# Patient Record
Sex: FEMALE | Race: ASIAN | NOT HISPANIC OR LATINO
[De-identification: names, ages, dates, MRNs, and addresses within clinical notes are randomized per-mention and may not be internally consistent; named-entity substitution may affect disease eponyms.]

---

## 2017-03-07 PROBLEM — Z00.00 ENCOUNTER FOR PREVENTIVE HEALTH EXAMINATION: Status: ACTIVE | Noted: 2017-03-07

## 2017-03-13 ENCOUNTER — APPOINTMENT (OUTPATIENT)
Dept: ANTEPARTUM | Facility: CLINIC | Age: 42
End: 2017-03-13

## 2017-03-13 ENCOUNTER — ASOB RESULT (OUTPATIENT)
Age: 42
End: 2017-03-13

## 2017-04-28 ENCOUNTER — APPOINTMENT (OUTPATIENT)
Dept: ANTEPARTUM | Facility: CLINIC | Age: 42
End: 2017-04-28

## 2017-05-01 ENCOUNTER — APPOINTMENT (OUTPATIENT)
Dept: ANTEPARTUM | Facility: CLINIC | Age: 42
End: 2017-05-01

## 2017-05-01 ENCOUNTER — ASOB RESULT (OUTPATIENT)
Age: 42
End: 2017-05-01

## 2017-06-02 ENCOUNTER — APPOINTMENT (OUTPATIENT)
Dept: ANTEPARTUM | Facility: CLINIC | Age: 42
End: 2017-06-02

## 2017-06-02 ENCOUNTER — ASOB RESULT (OUTPATIENT)
Age: 42
End: 2017-06-02

## 2017-07-05 ENCOUNTER — APPOINTMENT (OUTPATIENT)
Dept: ANTEPARTUM | Facility: CLINIC | Age: 42
End: 2017-07-05

## 2017-07-05 ENCOUNTER — ASOB RESULT (OUTPATIENT)
Age: 42
End: 2017-07-05

## 2017-08-04 ENCOUNTER — APPOINTMENT (OUTPATIENT)
Dept: ANTEPARTUM | Facility: CLINIC | Age: 42
End: 2017-08-04
Payer: COMMERCIAL

## 2017-08-04 ENCOUNTER — ASOB RESULT (OUTPATIENT)
Age: 42
End: 2017-08-04

## 2017-08-04 PROCEDURE — 76816 OB US FOLLOW-UP PER FETUS: CPT

## 2017-08-04 PROCEDURE — 76819 FETAL BIOPHYS PROFIL W/O NST: CPT

## 2017-08-10 ENCOUNTER — EMERGENCY (EMERGENCY)
Facility: HOSPITAL | Age: 42
LOS: 1 days | Discharge: STILL ADMITTED OR ADMINISTRATIVE | End: 2017-08-10
Admitting: EMERGENCY MEDICINE

## 2017-08-10 ENCOUNTER — TRANSCRIPTION ENCOUNTER (OUTPATIENT)
Age: 42
End: 2017-08-10

## 2017-08-10 ENCOUNTER — INPATIENT (INPATIENT)
Facility: HOSPITAL | Age: 42
LOS: 1 days | Discharge: HOME | End: 2017-08-12
Attending: OBSTETRICS & GYNECOLOGY | Admitting: OBSTETRICS & GYNECOLOGY

## 2017-08-10 VITALS — HEIGHT: 66 IN | WEIGHT: 169.76 LBS

## 2017-08-10 VITALS
TEMPERATURE: 98 F | HEART RATE: 97 BPM | DIASTOLIC BLOOD PRESSURE: 91 MMHG | RESPIRATION RATE: 16 BRPM | SYSTOLIC BLOOD PRESSURE: 132 MMHG | OXYGEN SATURATION: 100 %

## 2017-08-10 DIAGNOSIS — O26.90 PREGNANCY RELATED CONDITIONS, UNSPECIFIED, UNSPECIFIED TRIMESTER: ICD-10-CM

## 2017-08-10 DIAGNOSIS — Z3A.00 WEEKS OF GESTATION OF PREGNANCY NOT SPECIFIED: ICD-10-CM

## 2017-08-10 LAB
BASOPHILS # BLD AUTO: 0.03 K/UL — SIGNIFICANT CHANGE UP (ref 0–0.2)
BASOPHILS NFR BLD AUTO: 0.3 % — SIGNIFICANT CHANGE UP (ref 0–2)
BASOPHILS NFR SPEC: 0 % — SIGNIFICANT CHANGE UP (ref 0–2)
BLD GP AB SCN SERPL QL: NEGATIVE — SIGNIFICANT CHANGE UP
EOSINOPHIL # BLD AUTO: 0.06 K/UL — SIGNIFICANT CHANGE UP (ref 0–0.5)
EOSINOPHIL NFR BLD AUTO: 0.6 % — SIGNIFICANT CHANGE UP (ref 0–6)
EOSINOPHIL NFR FLD: 0 % — SIGNIFICANT CHANGE UP (ref 0–6)
GIANT PLATELETS BLD QL SMEAR: PRESENT — SIGNIFICANT CHANGE UP
GRAM STN FLD: SIGNIFICANT CHANGE UP
HCT VFR BLD CALC: 39.3 % — SIGNIFICANT CHANGE UP (ref 34.5–45)
HGB BLD-MCNC: 12.7 G/DL — SIGNIFICANT CHANGE UP (ref 11.5–15.5)
IMM GRANULOCYTES # BLD AUTO: 0.43 # — SIGNIFICANT CHANGE UP
IMM GRANULOCYTES NFR BLD AUTO: 4.3 % — HIGH (ref 0–1.5)
LYMPHOCYTES # BLD AUTO: 2.09 K/UL — SIGNIFICANT CHANGE UP (ref 1–3.3)
LYMPHOCYTES # BLD AUTO: 20.7 % — SIGNIFICANT CHANGE UP (ref 13–44)
LYMPHOCYTES NFR SPEC AUTO: 20.3 % — SIGNIFICANT CHANGE UP (ref 13–44)
MCHC RBC-ENTMCNC: 27.7 PG — SIGNIFICANT CHANGE UP (ref 27–34)
MCHC RBC-ENTMCNC: 32.3 % — SIGNIFICANT CHANGE UP (ref 32–36)
MCV RBC AUTO: 85.6 FL — SIGNIFICANT CHANGE UP (ref 80–100)
METAMYELOCYTES # FLD: 0.9 % — SIGNIFICANT CHANGE UP (ref 0–1)
MONOCYTES # BLD AUTO: 0.83 K/UL — SIGNIFICANT CHANGE UP (ref 0–0.9)
MONOCYTES NFR BLD AUTO: 8.2 % — SIGNIFICANT CHANGE UP (ref 2–14)
MONOCYTES NFR BLD: 5.3 % — SIGNIFICANT CHANGE UP (ref 2–9)
MORPHOLOGY BLD-IMP: NORMAL — SIGNIFICANT CHANGE UP
MYELOCYTES NFR BLD: 1.8 % — HIGH (ref 0–0)
NEUTROPHIL AB SER-ACNC: 70.8 % — SIGNIFICANT CHANGE UP (ref 43–77)
NEUTROPHILS # BLD AUTO: 6.66 K/UL — SIGNIFICANT CHANGE UP (ref 1.8–7.4)
NEUTROPHILS NFR BLD AUTO: 65.9 % — SIGNIFICANT CHANGE UP (ref 43–77)
NEUTS BAND # BLD: 0.9 % — SIGNIFICANT CHANGE UP (ref 0–6)
NRBC # FLD: 0 — SIGNIFICANT CHANGE UP
PLATELET # BLD AUTO: 84 K/UL — LOW (ref 150–400)
PLATELET COUNT - ESTIMATE: SIGNIFICANT CHANGE UP
PMV BLD: 12.6 FL — SIGNIFICANT CHANGE UP (ref 7–13)
RBC # BLD: 4.59 M/UL — SIGNIFICANT CHANGE UP (ref 3.8–5.2)
RBC # FLD: 15.2 % — HIGH (ref 10.3–14.5)
RH IG SCN BLD-IMP: POSITIVE — SIGNIFICANT CHANGE UP
RH IG SCN BLD-IMP: POSITIVE — SIGNIFICANT CHANGE UP
SPECIMEN SOURCE: SIGNIFICANT CHANGE UP
T PALLIDUM AB TITR SER: NEGATIVE — SIGNIFICANT CHANGE UP
WBC # BLD: 10.1 K/UL — SIGNIFICANT CHANGE UP (ref 3.8–10.5)
WBC # FLD AUTO: 10.1 K/UL — SIGNIFICANT CHANGE UP (ref 3.8–10.5)

## 2017-08-10 RX ORDER — DIBUCAINE 1 %
1 OINTMENT (GRAM) RECTAL EVERY 4 HOURS
Qty: 0 | Refills: 0 | Status: DISCONTINUED | OUTPATIENT
Start: 2017-08-10 | End: 2017-08-10

## 2017-08-10 RX ORDER — ACETAMINOPHEN 500 MG
975 TABLET ORAL EVERY 6 HOURS
Qty: 0 | Refills: 0 | Status: DISCONTINUED | OUTPATIENT
Start: 2017-08-10 | End: 2017-08-12

## 2017-08-10 RX ORDER — SODIUM CHLORIDE 9 MG/ML
1000 INJECTION, SOLUTION INTRAVENOUS ONCE
Qty: 0 | Refills: 0 | Status: DISCONTINUED | OUTPATIENT
Start: 2017-08-10 | End: 2017-08-10

## 2017-08-10 RX ORDER — AMPICILLIN TRIHYDRATE 250 MG
1 CAPSULE ORAL EVERY 4 HOURS
Qty: 0 | Refills: 0 | Status: DISCONTINUED | OUTPATIENT
Start: 2017-08-10 | End: 2017-08-10

## 2017-08-10 RX ORDER — OXYTOCIN 10 UNIT/ML
333.3 VIAL (ML) INJECTION
Qty: 20 | Refills: 0 | Status: COMPLETED | OUTPATIENT
Start: 2017-08-10

## 2017-08-10 RX ORDER — AER TRAVELER 0.5 G/1
1 SOLUTION RECTAL; TOPICAL EVERY 4 HOURS
Qty: 0 | Refills: 0 | Status: DISCONTINUED | OUTPATIENT
Start: 2017-08-10 | End: 2017-08-10

## 2017-08-10 RX ORDER — BUTORPHANOL TARTRATE 2 MG/ML
2 INJECTION, SOLUTION INTRAMUSCULAR; INTRAVENOUS ONCE
Qty: 0 | Refills: 0 | Status: DISCONTINUED | OUTPATIENT
Start: 2017-08-10 | End: 2017-08-10

## 2017-08-10 RX ORDER — OXYCODONE HYDROCHLORIDE 5 MG/1
5 TABLET ORAL EVERY 4 HOURS
Qty: 0 | Refills: 0 | Status: DISCONTINUED | OUTPATIENT
Start: 2017-08-10 | End: 2017-08-12

## 2017-08-10 RX ORDER — OXYTOCIN 10 UNIT/ML
41.67 VIAL (ML) INJECTION
Qty: 20 | Refills: 0 | Status: DISCONTINUED | OUTPATIENT
Start: 2017-08-10 | End: 2017-08-10

## 2017-08-10 RX ORDER — PRAMOXINE HYDROCHLORIDE 150 MG/15G
1 AEROSOL, FOAM RECTAL EVERY 4 HOURS
Qty: 0 | Refills: 0 | Status: DISCONTINUED | OUTPATIENT
Start: 2017-08-10 | End: 2017-08-10

## 2017-08-10 RX ORDER — ACETAMINOPHEN 500 MG
975 TABLET ORAL EVERY 6 HOURS
Qty: 0 | Refills: 0 | Status: DISCONTINUED | OUTPATIENT
Start: 2017-08-10 | End: 2017-08-11

## 2017-08-10 RX ORDER — SODIUM CHLORIDE 9 MG/ML
3 INJECTION INTRAMUSCULAR; INTRAVENOUS; SUBCUTANEOUS EVERY 8 HOURS
Qty: 0 | Refills: 0 | Status: DISCONTINUED | OUTPATIENT
Start: 2017-08-10 | End: 2017-08-10

## 2017-08-10 RX ORDER — OXYTOCIN 10 UNIT/ML
2 VIAL (ML) INJECTION
Qty: 30 | Refills: 0 | Status: DISCONTINUED | OUTPATIENT
Start: 2017-08-10 | End: 2017-08-11

## 2017-08-10 RX ORDER — ACETAMINOPHEN 500 MG
975 TABLET ORAL EVERY 6 HOURS
Qty: 0 | Refills: 0 | Status: COMPLETED | OUTPATIENT
Start: 2017-08-10 | End: 2018-07-09

## 2017-08-10 RX ORDER — IBUPROFEN 200 MG
600 TABLET ORAL EVERY 6 HOURS
Qty: 0 | Refills: 0 | Status: DISCONTINUED | OUTPATIENT
Start: 2017-08-10 | End: 2017-08-12

## 2017-08-10 RX ORDER — OXYCODONE HYDROCHLORIDE 5 MG/1
5 TABLET ORAL
Qty: 0 | Refills: 0 | Status: DISCONTINUED | OUTPATIENT
Start: 2017-08-10 | End: 2017-08-12

## 2017-08-10 RX ORDER — IBUPROFEN 200 MG
600 TABLET ORAL EVERY 6 HOURS
Qty: 0 | Refills: 0 | Status: COMPLETED | OUTPATIENT
Start: 2017-08-10 | End: 2018-07-09

## 2017-08-10 RX ORDER — KETOROLAC TROMETHAMINE 30 MG/ML
30 SYRINGE (ML) INJECTION ONCE
Qty: 0 | Refills: 0 | Status: DISCONTINUED | OUTPATIENT
Start: 2017-08-10 | End: 2017-08-11

## 2017-08-10 RX ORDER — OXYTOCIN 10 UNIT/ML
333.3 VIAL (ML) INJECTION
Qty: 20 | Refills: 0 | Status: DISCONTINUED | OUTPATIENT
Start: 2017-08-10 | End: 2017-08-11

## 2017-08-10 RX ORDER — HYDROCORTISONE 1 %
1 OINTMENT (GRAM) TOPICAL EVERY 4 HOURS
Qty: 0 | Refills: 0 | Status: DISCONTINUED | OUTPATIENT
Start: 2017-08-10 | End: 2017-08-10

## 2017-08-10 RX ORDER — AMPICILLIN TRIHYDRATE 250 MG
CAPSULE ORAL
Qty: 0 | Refills: 0 | Status: DISCONTINUED | OUTPATIENT
Start: 2017-08-10 | End: 2017-08-10

## 2017-08-10 RX ORDER — SODIUM CHLORIDE 9 MG/ML
1000 INJECTION, SOLUTION INTRAVENOUS
Qty: 0 | Refills: 0 | Status: DISCONTINUED | OUTPATIENT
Start: 2017-08-10 | End: 2017-08-10

## 2017-08-10 RX ORDER — AMPICILLIN TRIHYDRATE 250 MG
2 CAPSULE ORAL ONCE
Qty: 0 | Refills: 0 | Status: COMPLETED | OUTPATIENT
Start: 2017-08-10 | End: 2017-08-10

## 2017-08-10 RX ADMIN — SODIUM CHLORIDE 125 MILLILITER(S): 9 INJECTION, SOLUTION INTRAVENOUS at 09:41

## 2017-08-10 RX ADMIN — BUTORPHANOL TARTRATE 2 MILLIGRAM(S): 2 INJECTION, SOLUTION INTRAMUSCULAR; INTRAVENOUS at 05:58

## 2017-08-10 RX ADMIN — Medication 208 GRAM(S): at 06:38

## 2017-08-10 RX ADMIN — Medication 125 MILLIUNIT(S)/MIN: at 11:42

## 2017-08-10 RX ADMIN — Medication 12 MILLIGRAM(S): at 02:37

## 2017-08-10 RX ADMIN — BUTORPHANOL TARTRATE 2 MILLIGRAM(S): 2 INJECTION, SOLUTION INTRAMUSCULAR; INTRAVENOUS at 06:12

## 2017-08-10 RX ADMIN — Medication 204 MILLIGRAM(S): at 05:55

## 2017-08-10 RX ADMIN — SODIUM CHLORIDE 125 MILLILITER(S): 9 INJECTION, SOLUTION INTRAVENOUS at 02:38

## 2017-08-10 RX ADMIN — Medication 208 GRAM(S): at 02:37

## 2017-08-10 RX ADMIN — Medication 999.9 MILLIUNIT(S)/MIN: at 10:11

## 2017-08-10 NOTE — DISCHARGE NOTE OB - PATIENT PORTAL LINK FT
“You can access the FollowHealth Patient Portal, offered by [facility], by registering with the following website: [email or web address]”

## 2017-08-10 NOTE — ED ADULT TRIAGE NOTE - CHIEF COMPLAINT QUOTE
pt is 35 weeks pregnant and states ":I think my water broke, pt is due sept 17th, denies contractions at this time, L&D called, pt to go upstairs

## 2017-08-10 NOTE — DISCHARGE NOTE OB - CARE PROVIDER_API CALL
Waleska Rangel), [address]  [address] [place] [address]  Phone: [phone]  Fax: [phone]

## 2017-08-11 LAB — SPECIMEN SOURCE: SIGNIFICANT CHANGE UP

## 2017-08-11 RX ORDER — SODIUM CHLORIDE 9 MG/ML
3 INJECTION INTRAMUSCULAR; INTRAVENOUS; SUBCUTANEOUS EVERY 8 HOURS
Qty: 0 | Refills: 0 | Status: DISCONTINUED | OUTPATIENT
Start: 2017-08-11 | End: 2017-08-11

## 2017-08-11 RX ORDER — HYDROCORTISONE 1 %
1 OINTMENT (GRAM) TOPICAL EVERY 4 HOURS
Qty: 0 | Refills: 0 | Status: DISCONTINUED | OUTPATIENT
Start: 2017-08-11 | End: 2017-08-12

## 2017-08-11 RX ORDER — PRAMOXINE HYDROCHLORIDE 150 MG/15G
1 AEROSOL, FOAM RECTAL EVERY 4 HOURS
Qty: 0 | Refills: 0 | Status: DISCONTINUED | OUTPATIENT
Start: 2017-08-11 | End: 2017-08-12

## 2017-08-11 RX ORDER — AER TRAVELER 0.5 G/1
1 SOLUTION RECTAL; TOPICAL EVERY 4 HOURS
Qty: 0 | Refills: 0 | Status: DISCONTINUED | OUTPATIENT
Start: 2017-08-11 | End: 2017-08-12

## 2017-08-11 RX ORDER — LANOLIN
1 OINTMENT (GRAM) TOPICAL EVERY 6 HOURS
Qty: 0 | Refills: 0 | Status: DISCONTINUED | OUTPATIENT
Start: 2017-08-11 | End: 2017-08-12

## 2017-08-11 RX ORDER — TETANUS TOXOID, REDUCED DIPHTHERIA TOXOID AND ACELLULAR PERTUSSIS VACCINE, ADSORBED 5; 2.5; 8; 8; 2.5 [IU]/.5ML; [IU]/.5ML; UG/.5ML; UG/.5ML; UG/.5ML
0.5 SUSPENSION INTRAMUSCULAR ONCE
Qty: 0 | Refills: 0 | Status: COMPLETED | OUTPATIENT
Start: 2017-08-11 | End: 2018-07-10

## 2017-08-11 RX ORDER — OXYTOCIN 10 UNIT/ML
41.67 VIAL (ML) INJECTION
Qty: 20 | Refills: 0 | Status: DISCONTINUED | OUTPATIENT
Start: 2017-08-11 | End: 2017-08-11

## 2017-08-11 RX ORDER — GLYCERIN ADULT
1 SUPPOSITORY, RECTAL RECTAL AT BEDTIME
Qty: 0 | Refills: 0 | Status: DISCONTINUED | OUTPATIENT
Start: 2017-08-11 | End: 2017-08-12

## 2017-08-11 RX ORDER — MAGNESIUM HYDROXIDE 400 MG/1
30 TABLET, CHEWABLE ORAL
Qty: 0 | Refills: 0 | Status: DISCONTINUED | OUTPATIENT
Start: 2017-08-11 | End: 2017-08-12

## 2017-08-11 RX ORDER — DIPHENHYDRAMINE HCL 50 MG
25 CAPSULE ORAL EVERY 6 HOURS
Qty: 0 | Refills: 0 | Status: DISCONTINUED | OUTPATIENT
Start: 2017-08-11 | End: 2017-08-12

## 2017-08-11 RX ORDER — TETANUS TOXOID, REDUCED DIPHTHERIA TOXOID AND ACELLULAR PERTUSSIS VACCINE, ADSORBED 5; 2.5; 8; 8; 2.5 [IU]/.5ML; [IU]/.5ML; UG/.5ML; UG/.5ML; UG/.5ML
0.5 SUSPENSION INTRAMUSCULAR ONCE
Qty: 0 | Refills: 0 | Status: COMPLETED | OUTPATIENT
Start: 2017-08-11 | End: 2017-08-11

## 2017-08-11 RX ORDER — DOCUSATE SODIUM 100 MG
100 CAPSULE ORAL
Qty: 0 | Refills: 0 | Status: DISCONTINUED | OUTPATIENT
Start: 2017-08-11 | End: 2017-08-12

## 2017-08-11 RX ORDER — SIMETHICONE 80 MG/1
80 TABLET, CHEWABLE ORAL EVERY 6 HOURS
Qty: 0 | Refills: 0 | Status: DISCONTINUED | OUTPATIENT
Start: 2017-08-11 | End: 2017-08-12

## 2017-08-11 RX ORDER — DIBUCAINE 1 %
1 OINTMENT (GRAM) RECTAL EVERY 4 HOURS
Qty: 0 | Refills: 0 | Status: DISCONTINUED | OUTPATIENT
Start: 2017-08-11 | End: 2017-08-12

## 2017-08-11 RX ADMIN — TETANUS TOXOID, REDUCED DIPHTHERIA TOXOID AND ACELLULAR PERTUSSIS VACCINE, ADSORBED 0.5 MILLILITER(S): 5; 2.5; 8; 8; 2.5 SUSPENSION INTRAMUSCULAR at 23:01

## 2017-08-11 RX ADMIN — Medication 600 MILLIGRAM(S): at 20:30

## 2017-08-11 RX ADMIN — Medication 600 MILLIGRAM(S): at 19:55

## 2017-08-11 NOTE — PROGRESS NOTE ADULT - ASSESSMENT
Assessment and Plan  PPD #1 s/p   Doing well.  Encourage ambulation.  PP & PPD Instructions reviewed.  CPC.  D/C home tomorrow

## 2017-08-11 NOTE — PROGRESS NOTE ADULT - SUBJECTIVE AND OBJECTIVE BOX
Post-partum Note,   She is a  42y woman who is now post-partum day: 1    Subjective:  The patient feels well.  She is ambulating.   She is tolerating regular diet.  She denies nausea and vomiting; denies fever.  She is voiding.  Her pain is controlled.  She reports normal postpartum bleeding.  She is breastfeeding and formula feeding.    Physical exam:    Vital Signs Last 24 Hrs  T(C): 36.6 (11 Aug 2017 05:39), Max: 36.6 (11 Aug 2017 05:39)  T(F): 97.9 (11 Aug 2017 05:39), Max: 97.9 (11 Aug 2017 05:39)  HR: 78 (11 Aug 2017 05:39) (78 - 82)  BP: 94/53 (11 Aug 2017 05:39) (94/53 - 97/58)  BP(mean): --  RR: 18 (11 Aug 2017 05:39) (16 - 18)  SpO2: 99% (11 Aug 2017 05:39) (99% - 100%)    Gen: NAD  Breast: Soft, nontender, not engorged.  Abdomen: Soft, nontender, no distension , firm uterine fundus at umbilicus.  Pelvic: Normal lochia noted  Ext: No calf tenderness    LABS:                        12.7   10.10 )-----------( 84       ( 10 Aug 2017 02:35 )             39.3       Rubella status:     Allergies    No Known Allergies    Intolerances      MEDICATIONS  (STANDING):  oxyCODONE    IR 5 milliGRAM(s) Oral every 3 hours  ibuprofen  Tablet 600 milliGRAM(s) Oral every 6 hours  acetaminophen   Tablet. 975 milliGRAM(s) Oral every 6 hours  diphtheria/tetanus/pertussis (acellular) Vaccine (ADAcel) 0.5 milliLiter(s) IntraMuscular once  prenatal multivitamin 1 Tablet(s) Oral daily    MEDICATIONS  (PRN):  oxyCODONE    IR 5 milliGRAM(s) Oral every 4 hours PRN Severe Pain (7 -10)  hydrocortisone 1% Cream 1 Application(s) Topical every 4 hours PRN perineal discomfort  pramoxine 1%/zinc 5% Cream 1 Application(s) Topical every 4 hours PRN perineal discomfort  dibucaine 1% Ointment 1 Application(s) Topical every 4 hours PRN Perineal Discomfort  lanolin Ointment 1 Application(s) Topical every 6 hours PRN Sore Nipples  witch hazel Pads 1 Application(s) Topical every 4 hours PRN Perineal Discomfort  simethicone 80 milliGRAM(s) Chew every 6 hours PRN Gas  diphenhydrAMINE   Capsule 25 milliGRAM(s) Oral every 6 hours PRN Itching  glycerin Suppository - Adult 1 Suppository(s) Rectal at bedtime PRN Constipation  magnesium hydroxide Suspension 30 milliLiter(s) Oral two times a day PRN Constipation  docusate sodium 100 milliGRAM(s) Oral two times a day PRN Stool Softening

## 2017-08-12 VITALS
SYSTOLIC BLOOD PRESSURE: 96 MMHG | RESPIRATION RATE: 16 BRPM | OXYGEN SATURATION: 100 % | DIASTOLIC BLOOD PRESSURE: 66 MMHG | HEART RATE: 73 BPM | TEMPERATURE: 98 F

## 2017-08-12 DIAGNOSIS — O34.219 MATERNAL CARE FOR UNSPECIFIED TYPE SCAR FROM PREVIOUS CESAREAN DELIVERY: ICD-10-CM

## 2017-08-12 RX ORDER — AMPICILLIN TRIHYDRATE 250 MG
1 CAPSULE ORAL
Qty: 28 | Refills: 0
Start: 2017-08-12 | End: 2017-08-19

## 2017-08-12 RX ORDER — AMPICILLIN TRIHYDRATE 250 MG
1 CAPSULE ORAL
Qty: 28 | Refills: 0 | OUTPATIENT
Start: 2017-08-12 | End: 2017-08-19

## 2017-08-12 RX ORDER — ACETAMINOPHEN 500 MG
3 TABLET ORAL
Qty: 0 | Refills: 0 | DISCHARGE
Start: 2017-08-12

## 2017-08-12 RX ORDER — IBUPROFEN 200 MG
1 TABLET ORAL
Qty: 0 | Refills: 0 | DISCHARGE
Start: 2017-08-12

## 2017-08-12 NOTE — PROGRESS NOTE ADULT - PROBLEM SELECTOR PLAN 1
- Pain well controlled, continue current pain regimen  - Increase ambulation, SCDs when not ambulating  - Continue regular diet  - Discharge planning     Nickie Shrestha MD, PGY1

## 2017-08-12 NOTE — PROGRESS NOTE ADULT - SUBJECTIVE AND OBJECTIVE BOX
S:  MADISON BENITEZ is a 42y Female [place] [facility] vaginal delivery Day 2.        She is comfortable and offers no compliants.    O:  T(C): 36.6 (08-12-17 @ 06:18), Max: 36.8 (08-11-17 @ 18:46)  HR: 78 (08-12-17 @ 06:18) (78 - 84)  BP: 102/62 (08-12-17 @ 06:18) (102/62 - 102/67)  RR: 17 (08-12-17 @ 06:18) (17 - 18)  SpO2: 97% (08-12-17 @ 06:18) (97% - 99%)  Wt(kg): --                       Blood type:Type + Screen (08.10.17 @ 02:08)    ABO Interpretation: O    Rh Interpretation: Positive    Antibody Screen: Negative                                  Rubella: Immune                 General: alert and oriented           Breast:  soft, nontender,            Abdomin:  soft nontender, BS+, Flatus(+), BM(+)           Fundus:  firm, nontender, below the umbilicus           Extremities:  no edema, no cyanosis, normal reflexes           Lochia:  mild    A:  Stable, Postpartum    P:   MADISON BENITEZ will be discharged home today. She is given instructions:                            1. nothing per vagina-no tampons, douches, baths, swiming or sex for 6-8 weeks             2.  to take ibuprofen 600 mg every 6 hours for pain or cramps. (Can take two Tylenol 325mg tablets every 6 hours as an                    alternative pain medication-NO MORE THAN 4000mg of Tylenol over 24hours)                       3.  Call the office and make postpartum visit for 6 weeks; sooner if bleeding becomes heavier, or she has feelings of                  depression or anxiety or develops a fever greater than 100.4 F.                 To use abdominal binder while awake as needed                 Verbal discharge instructions d/w patient  - discharge summary to be  given to her on discharge for the hospital             4.  No driving x 2 weeks.  MADISON BENITEZ is not to drive if taking narcotics             5.  Continue taking prenatal vitamins

## 2017-08-12 NOTE — PROGRESS NOTE ADULT - SUBJECTIVE AND OBJECTIVE BOX
OB Progress Note:  PPD#2    S: 41yo  PPD#2 s/p . Patient feels well. Pain is well controlled. She is tolerating a regular diet and passing flatus. She is voiding spontaneously, and ambulating without difficulty. Denies CP/SOB. Denies lightheadedness/dizziness. Denies N/V.    O:  Vitals:   Vital Signs Last 24 Hrs  T(C): 36.8 (11 Aug 2017 18:46), Max: 36.8 (11 Aug 2017 18:46)  T(F): 98.2 (11 Aug 2017 18:46), Max: 98.2 (11 Aug 2017 18:46)  HR: 84 (11 Aug 2017 18:46) (78 - 84)  BP: 102/67 (11 Aug 2017 18:46) (94/53 - 102/67)  BP(mean): --  RR: 18 (11 Aug 2017 18:46) (18 - 18)  SpO2: 99% (11 Aug 2017 18:46) (99% - 99%)    MEDICATIONS  (STANDING):  oxyCODONE    IR 5 milliGRAM(s) Oral every 3 hours  ibuprofen  Tablet 600 milliGRAM(s) Oral every 6 hours  acetaminophen   Tablet. 975 milliGRAM(s) Oral every 6 hours  prenatal multivitamin 1 Tablet(s) Oral daily    MEDICATIONS  (PRN):  oxyCODONE    IR 5 milliGRAM(s) Oral every 4 hours PRN Severe Pain (7 -10)  hydrocortisone 1% Cream 1 Application(s) Topical every 4 hours PRN perineal discomfort  pramoxine 1%/zinc 5% Cream 1 Application(s) Topical every 4 hours PRN perineal discomfort  dibucaine 1% Ointment 1 Application(s) Topical every 4 hours PRN Perineal Discomfort  lanolin Ointment 1 Application(s) Topical every 6 hours PRN Sore Nipples  witch hazel Pads 1 Application(s) Topical every 4 hours PRN Perineal Discomfort  simethicone 80 milliGRAM(s) Chew every 6 hours PRN Gas  diphenhydrAMINE   Capsule 25 milliGRAM(s) Oral every 6 hours PRN Itching  glycerin Suppository - Adult 1 Suppository(s) Rectal at bedtime PRN Constipation  magnesium hydroxide Suspension 30 milliLiter(s) Oral two times a day PRN Constipation  docusate sodium 100 milliGRAM(s) Oral two times a day PRN Stool Softening      Labs:  Blood type: O Positive  Rubella IgG: RPR: Negative                          12.7   10.10 >-----------< 84<L>    ( 08-10 @ 02:35 )             39.3    Physical Exam:  General: NAD  Abdomen: soft, non-tender, non-distended, fundus firm  Vaginal: Lochia wnl  Extremities: No erythema/edema

## 2017-08-14 LAB
-  CEFAZOLIN: SIGNIFICANT CHANGE UP
-  CEFAZOLIN: SIGNIFICANT CHANGE UP
-  CIPROFLOXACIN: SIGNIFICANT CHANGE UP
-  CIPROFLOXACIN: SIGNIFICANT CHANGE UP
-  CLINDAMYCIN: SIGNIFICANT CHANGE UP
-  CLINDAMYCIN: SIGNIFICANT CHANGE UP
-  DAPTOMYCIN: SIGNIFICANT CHANGE UP
-  ERYTHROMYCIN: SIGNIFICANT CHANGE UP
-  ERYTHROMYCIN: SIGNIFICANT CHANGE UP
-  GENTAMICIN: SIGNIFICANT CHANGE UP
-  GENTAMICIN: SIGNIFICANT CHANGE UP
-  LINEZOLID: SIGNIFICANT CHANGE UP
-  MOXIFLOXACIN(AEROBIC): SIGNIFICANT CHANGE UP
-  MOXIFLOXACIN(AEROBIC): SIGNIFICANT CHANGE UP
-  OXACILLIN: SIGNIFICANT CHANGE UP
-  OXACILLIN: SIGNIFICANT CHANGE UP
-  PENICILLIN: SIGNIFICANT CHANGE UP
-  PENICILLIN: SIGNIFICANT CHANGE UP
-  RIFAMPIN.: SIGNIFICANT CHANGE UP
-  RIFAMPIN.: SIGNIFICANT CHANGE UP
-  TETRACYCLINE: SIGNIFICANT CHANGE UP
-  TETRACYCLINE: SIGNIFICANT CHANGE UP
-  TRIMETHOPRIM/SULFAMETHOXAZOLE: SIGNIFICANT CHANGE UP
-  TRIMETHOPRIM/SULFAMETHOXAZOLE: SIGNIFICANT CHANGE UP
-  VANCOMYCIN: SIGNIFICANT CHANGE UP
-  VANCOMYCIN: SIGNIFICANT CHANGE UP
BACTERIA WND CULT: SIGNIFICANT CHANGE UP
METHOD TYPE: SIGNIFICANT CHANGE UP
METHOD TYPE: SIGNIFICANT CHANGE UP
ORGANISM # SPEC MICROSCOPIC CNT: SIGNIFICANT CHANGE UP

## 2017-08-25 ENCOUNTER — APPOINTMENT (OUTPATIENT)
Dept: ANTEPARTUM | Facility: CLINIC | Age: 42
End: 2017-08-25

## 2017-08-25 ENCOUNTER — APPOINTMENT (OUTPATIENT)
Dept: ANTEPARTUM | Facility: HOSPITAL | Age: 42
End: 2017-08-25

## 2019-11-20 ENCOUNTER — RESULT REVIEW (OUTPATIENT)
Age: 44
End: 2019-11-20

## 2021-09-04 ENCOUNTER — EMERGENCY (EMERGENCY)
Facility: HOSPITAL | Age: 46
LOS: 1 days | Discharge: HOME | End: 2021-09-04
Attending: EMERGENCY MEDICINE | Admitting: EMERGENCY MEDICINE
Payer: COMMERCIAL

## 2021-09-04 VITALS
OXYGEN SATURATION: 100 % | TEMPERATURE: 98 F | HEART RATE: 81 BPM | HEIGHT: 66 IN | RESPIRATION RATE: 16 BRPM | DIASTOLIC BLOOD PRESSURE: 55 MMHG | SYSTOLIC BLOOD PRESSURE: 121 MMHG

## 2021-09-04 VITALS
TEMPERATURE: 97 F | HEART RATE: 71 BPM | OXYGEN SATURATION: 98 % | RESPIRATION RATE: 15 BRPM | SYSTOLIC BLOOD PRESSURE: 107 MMHG | DIASTOLIC BLOOD PRESSURE: 63 MMHG

## 2021-09-04 LAB
ALBUMIN SERPL ELPH-MCNC: 4.4 G/DL — SIGNIFICANT CHANGE UP (ref 3.3–5)
ALP SERPL-CCNC: 50 U/L — SIGNIFICANT CHANGE UP (ref 40–120)
ALT FLD-CCNC: 12 U/L — SIGNIFICANT CHANGE UP (ref 4–33)
ANION GAP SERPL CALC-SCNC: 12 MMOL/L — SIGNIFICANT CHANGE UP (ref 7–14)
ANISOCYTOSIS BLD QL: SLIGHT — SIGNIFICANT CHANGE UP
APPEARANCE UR: CLEAR — SIGNIFICANT CHANGE UP
AST SERPL-CCNC: 17 U/L — SIGNIFICANT CHANGE UP (ref 4–32)
BACTERIA # UR AUTO: NEGATIVE — SIGNIFICANT CHANGE UP
BASOPHILS # BLD AUTO: 0.04 K/UL — SIGNIFICANT CHANGE UP (ref 0–0.2)
BASOPHILS NFR BLD AUTO: 0.7 % — SIGNIFICANT CHANGE UP (ref 0–2)
BILIRUB SERPL-MCNC: 0.3 MG/DL — SIGNIFICANT CHANGE UP (ref 0.2–1.2)
BILIRUB UR-MCNC: NEGATIVE — SIGNIFICANT CHANGE UP
BLD GP AB SCN SERPL QL: NEGATIVE — SIGNIFICANT CHANGE UP
BUN SERPL-MCNC: 6 MG/DL — LOW (ref 7–23)
CALCIUM SERPL-MCNC: 9.5 MG/DL — SIGNIFICANT CHANGE UP (ref 8.4–10.5)
CHLORIDE SERPL-SCNC: 97 MMOL/L — LOW (ref 98–107)
CO2 SERPL-SCNC: 22 MMOL/L — SIGNIFICANT CHANGE UP (ref 22–31)
COLOR SPEC: COLORLESS — SIGNIFICANT CHANGE UP
CREAT SERPL-MCNC: 0.74 MG/DL — SIGNIFICANT CHANGE UP (ref 0.5–1.3)
DACRYOCYTES BLD QL SMEAR: SLIGHT — SIGNIFICANT CHANGE UP
DIFF PNL FLD: ABNORMAL
ELLIPTOCYTES BLD QL SMEAR: SLIGHT — SIGNIFICANT CHANGE UP
EOSINOPHIL # BLD AUTO: 0.02 K/UL — SIGNIFICANT CHANGE UP (ref 0–0.5)
EOSINOPHIL NFR BLD AUTO: 0.4 % — SIGNIFICANT CHANGE UP (ref 0–6)
EPI CELLS # UR: 0 /HPF — SIGNIFICANT CHANGE UP (ref 0–5)
GIANT PLATELETS BLD QL SMEAR: PRESENT — SIGNIFICANT CHANGE UP
GLUCOSE SERPL-MCNC: 106 MG/DL — HIGH (ref 70–99)
GLUCOSE UR QL: NEGATIVE — SIGNIFICANT CHANGE UP
HCT VFR BLD CALC: 27.8 % — LOW (ref 34.5–45)
HGB BLD-MCNC: 7.9 G/DL — LOW (ref 11.5–15.5)
HYALINE CASTS # UR AUTO: 1 /LPF — SIGNIFICANT CHANGE UP (ref 0–7)
IANC: 3.78 K/UL — SIGNIFICANT CHANGE UP (ref 1.5–8.5)
IMM GRANULOCYTES NFR BLD AUTO: 0.5 % — SIGNIFICANT CHANGE UP (ref 0–1.5)
KETONES UR-MCNC: NEGATIVE — SIGNIFICANT CHANGE UP
LEUKOCYTE ESTERASE UR-ACNC: NEGATIVE — SIGNIFICANT CHANGE UP
LYMPHOCYTES # BLD AUTO: 1.21 K/UL — SIGNIFICANT CHANGE UP (ref 1–3.3)
LYMPHOCYTES # BLD AUTO: 22 % — SIGNIFICANT CHANGE UP (ref 13–44)
MANUAL SMEAR VERIFICATION: SIGNIFICANT CHANGE UP
MCHC RBC-ENTMCNC: 18.6 PG — LOW (ref 27–34)
MCHC RBC-ENTMCNC: 28.4 GM/DL — LOW (ref 32–36)
MCV RBC AUTO: 65.4 FL — LOW (ref 80–100)
MICROCYTES BLD QL: SLIGHT — SIGNIFICANT CHANGE UP
MONOCYTES # BLD AUTO: 0.43 K/UL — SIGNIFICANT CHANGE UP (ref 0–0.9)
MONOCYTES NFR BLD AUTO: 7.8 % — SIGNIFICANT CHANGE UP (ref 2–14)
NEUTROPHILS # BLD AUTO: 3.78 K/UL — SIGNIFICANT CHANGE UP (ref 1.8–7.4)
NEUTROPHILS NFR BLD AUTO: 68.6 % — SIGNIFICANT CHANGE UP (ref 43–77)
NITRITE UR-MCNC: NEGATIVE — SIGNIFICANT CHANGE UP
NRBC # BLD: 0 /100 WBCS — SIGNIFICANT CHANGE UP
NRBC # FLD: 0 K/UL — SIGNIFICANT CHANGE UP
PH UR: 6.5 — SIGNIFICANT CHANGE UP (ref 5–8)
PLAT MORPH BLD: NORMAL — SIGNIFICANT CHANGE UP
PLATELET # BLD AUTO: 132 K/UL — LOW (ref 150–400)
PLATELET COUNT - ESTIMATE: ABNORMAL
POIKILOCYTOSIS BLD QL AUTO: SLIGHT — SIGNIFICANT CHANGE UP
POLYCHROMASIA BLD QL SMEAR: SLIGHT — SIGNIFICANT CHANGE UP
POTASSIUM SERPL-MCNC: 3.9 MMOL/L — SIGNIFICANT CHANGE UP (ref 3.5–5.3)
POTASSIUM SERPL-SCNC: 3.9 MMOL/L — SIGNIFICANT CHANGE UP (ref 3.5–5.3)
PROT SERPL-MCNC: 7.9 G/DL — SIGNIFICANT CHANGE UP (ref 6–8.3)
PROT UR-MCNC: NEGATIVE — SIGNIFICANT CHANGE UP
RBC # BLD: 4.25 M/UL — SIGNIFICANT CHANGE UP (ref 3.8–5.2)
RBC # FLD: 19.6 % — HIGH (ref 10.3–14.5)
RBC BLD AUTO: ABNORMAL
RBC CASTS # UR COMP ASSIST: 12 /HPF — HIGH (ref 0–4)
RH IG SCN BLD-IMP: POSITIVE — SIGNIFICANT CHANGE UP
SODIUM SERPL-SCNC: 131 MMOL/L — LOW (ref 135–145)
SP GR SPEC: 1 — SIGNIFICANT CHANGE UP (ref 1–1.05)
TROPONIN T, HIGH SENSITIVITY RESULT: <6 NG/L — SIGNIFICANT CHANGE UP
TSH SERPL-MCNC: 0.54 UIU/ML — SIGNIFICANT CHANGE UP (ref 0.27–4.2)
UROBILINOGEN FLD QL: SIGNIFICANT CHANGE UP
VARIANT LYMPHS # BLD: 0.9 % — SIGNIFICANT CHANGE UP (ref 0–6)
WBC # BLD: 5.51 K/UL — SIGNIFICANT CHANGE UP (ref 3.8–10.5)
WBC # FLD AUTO: 5.51 K/UL — SIGNIFICANT CHANGE UP (ref 3.8–10.5)
WBC UR QL: 0 /HPF — SIGNIFICANT CHANGE UP (ref 0–5)

## 2021-09-04 PROCEDURE — 93010 ELECTROCARDIOGRAM REPORT: CPT

## 2021-09-04 PROCEDURE — 71046 X-RAY EXAM CHEST 2 VIEWS: CPT | Mod: 26

## 2021-09-04 PROCEDURE — 99285 EMERGENCY DEPT VISIT HI MDM: CPT | Mod: 25

## 2021-09-04 PROCEDURE — 71045 X-RAY EXAM CHEST 1 VIEW: CPT | Mod: 26,59

## 2021-09-04 RX ORDER — SODIUM CHLORIDE 9 MG/ML
1000 INJECTION INTRAMUSCULAR; INTRAVENOUS; SUBCUTANEOUS ONCE
Refills: 0 | Status: COMPLETED | OUTPATIENT
Start: 2021-09-04 | End: 2021-09-04

## 2021-09-04 RX ORDER — METOCLOPRAMIDE HCL 10 MG
10 TABLET ORAL ONCE
Refills: 0 | Status: COMPLETED | OUTPATIENT
Start: 2021-09-04 | End: 2021-09-04

## 2021-09-04 RX ORDER — ACETAMINOPHEN 500 MG
975 TABLET ORAL ONCE
Refills: 0 | Status: COMPLETED | OUTPATIENT
Start: 2021-09-04 | End: 2021-09-04

## 2021-09-04 RX ADMIN — Medication 975 MILLIGRAM(S): at 12:11

## 2021-09-04 RX ADMIN — Medication 10 MILLIGRAM(S): at 11:21

## 2021-09-04 RX ADMIN — SODIUM CHLORIDE 1000 MILLILITER(S): 9 INJECTION INTRAMUSCULAR; INTRAVENOUS; SUBCUTANEOUS at 11:20

## 2021-09-04 RX ADMIN — Medication 975 MILLIGRAM(S): at 11:20

## 2021-09-04 RX ADMIN — SODIUM CHLORIDE 1000 MILLILITER(S): 9 INJECTION INTRAMUSCULAR; INTRAVENOUS; SUBCUTANEOUS at 12:11

## 2021-09-04 NOTE — ED PROVIDER NOTE - ATTENDING CONTRIBUTION TO CARE
I performed a face to face evaluation of this patient and obtained a history and performed a full exam.  I agree with the history, physical exam and plan of the PA.    Brief HPI:  45 yo F pmh thyroid disease, anemia presents to the ED w/ headache and generalized weakness x1 week.  HA gradual onset, worsening, throbbing.      Vitals:   Reviewed    Exam:    GEN:  Non-toxic appearing, non-distressed, speaking full sentences, non-diaphoretic, AAOx3  HEENT:  NCAT, neck supple, EOMI, PERRLA, sclera anicteric, no conjunctival pallor or injection, no stridor, normal voice, no tonsillar exudate, uvula midline  CV:  regular rhythm and rate, s1/s2 audible, no murmurs, rubs or gallops, peripheral pulses 2+ and symmetric  PULM:  non-labored respirations, lungs clear to auscultation bilaterally, no wheezes, crackles or rales  ABD:  non distended, non-tender, no rebound, no guarding, negative Carmona's sign, bowel sounds normal, no cvat  MSK:  no gross deformity, non-tender extremities and joints, range of motion grossly normal appearing, no extremity edema, extremities warm and well perfused   NEURO:  AAOx3, CN II-XII intact, motor 5/5 in upper and lower extremities bilaterally, sensation grossly intact in extremities and trunk, finger to nose testing wnl, no nystagmus, negative Romberg, no pronator drift, no gait deficit  SKIN:  warm, dry, no rash or vesicles     A/P:  45 yo F pmh thyroid disease, anemia presents to the ED w/ headache and generalized weakness x1 week.  VSS AF.  Low concern for subarachnoid hemorrhage as headache not acute in onset, not maximal in onset, and is without associated photophobia or neck stiffness.  Low concern for meningitis as patient without fever, photophobia, or neck stiffness.  Suspect primary headache syndrome vs. viral etiology.  Plan for labs, supportive care.  Patient declines covid-19 testing at this time.  Dispo pending.

## 2021-09-04 NOTE — ED ADULT TRIAGE NOTE - CHIEF COMPLAINT QUOTE
Pt w/ hx of iron deficient anemia episode 2017 c/o generalized weakness x 2 days with associated right sided headache described as pounding which began this morning around 2am.

## 2021-09-04 NOTE — ED ADULT NURSE NOTE - OBJECTIVE STATEMENT
pt presents with c/o rt sided HA that started this morning, also c/o feeling weak for th past 2 days

## 2021-09-04 NOTE — ED PROVIDER NOTE - PATIENT PORTAL LINK FT
You can access the FollowMyHealth Patient Portal offered by [facility] by registering at the following website: [email or web address]. By joining [employer]’s FollowMyHealth portal, you will also be able to view your health information using other applications (apps) compatible with our system.

## 2021-09-04 NOTE — ED ADULT TRIAGE NOTE - IDEAL BODY WEIGHT(KG)
Care Coordinator- Discharge Planning Note     Admission Date/Time:  10/2/2017  Attending MD:  Vivian Stewart MD     Data  Chart reviewed, discussed with interdisciplinary team.   Patient was admitted for:   1. Wound infection    2. Hyperkalemia         RNCC Intervention: Per discussion in interdisciplinary rounds, the primary team states that the patient continues to require hospitalization for the following reasons: pending wound cultures.  Team states that the patient will require hospitalization for 1-2 more days.    Plan  Anticipated Discharge Date:  1-2 days   Anticipated Discharge Plan:  Home     CTS Handoff completed: cindy Hay RN, BSN, PHN, RNCCPH: [phone]  Pager: [phone]  Covering for : Felicity Ernandez, Medicine RNCC                           59

## 2021-09-04 NOTE — ED PROVIDER NOTE - OBJECTIVE STATEMENT
47 y/o F w/ PMHx thyroid disease presents to the ED w/ headache and generalized weakness all week. Pt describes R side pounding headache 7/10 in pain, some relief w/ Tylenol, not worsening or progressing. Denies associated nausea, vomiting, confusion, blurry vision, numbness, tingling sensation, neck pain, fever, or chills. Pt states being generally weak because of a iron deficiency history but never has been transfused. Denies chest pain, SOB, focal or neurological symptoms or any other complaints.

## 2021-09-04 NOTE — ED PROVIDER NOTE - CLINICAL SUMMARY MEDICAL DECISION MAKING FREE TEXT BOX
45 y/o F p/w headache and generalized weakness x1week. Pt is hemodynamically stable, focal and neurologically grossly normal. Impression is headache is likely migraine, no red flag suggestive of secondary cause. Generalized weakness maybe related to anemia. R/o severe anemia also CSH. Will obtain labs, monitor, and reassess. 47 y/o F p/w headache and generalized weakness x1week. Pt is hemodynamically stable, focal and neurologically grossly normal. Impression is headache is likely migraine, no red flag suggestive of secondary cause. Generalized weakness maybe related to anemia. R/o severe anemia requiring transfusion. Will obtain labs, monitor, and reassess.

## 2021-09-04 NOTE — ED PROVIDER NOTE - PROGRESS NOTE DETAILS
ARLEN Portillo: Labs reviewed. Hgb 7.9, will not transfuse. Pt reassessed, headache resolved, she states she feels better. Will dc home to f/u with PMD. Strict return precautions.

## 2021-10-22 ENCOUNTER — EMERGENCY (EMERGENCY)
Facility: HOSPITAL | Age: 46
LOS: 1 days | Discharge: HOME | End: 2021-10-22
Attending: STUDENT IN AN ORGANIZED HEALTH CARE EDUCATION/TRAINING PROGRAM | Admitting: STUDENT IN AN ORGANIZED HEALTH CARE EDUCATION/TRAINING PROGRAM
Payer: COMMERCIAL

## 2021-10-22 VITALS
HEIGHT: 66 IN | RESPIRATION RATE: 16 BRPM | DIASTOLIC BLOOD PRESSURE: 79 MMHG | OXYGEN SATURATION: 100 % | TEMPERATURE: 98 F | HEART RATE: 89 BPM | SYSTOLIC BLOOD PRESSURE: 126 MMHG

## 2021-10-22 VITALS
RESPIRATION RATE: 16 BRPM | SYSTOLIC BLOOD PRESSURE: 123 MMHG | TEMPERATURE: 98 F | DIASTOLIC BLOOD PRESSURE: 80 MMHG | OXYGEN SATURATION: 100 % | HEART RATE: 75 BPM

## 2021-10-22 LAB
ANION GAP SERPL CALC-SCNC: 14 MMOL/L — SIGNIFICANT CHANGE UP (ref 7–14)
BASOPHILS # BLD AUTO: 0.03 K/UL — SIGNIFICANT CHANGE UP (ref 0–0.2)
BASOPHILS NFR BLD AUTO: 0.6 % — SIGNIFICANT CHANGE UP (ref 0–2)
BUN SERPL-MCNC: 8 MG/DL — SIGNIFICANT CHANGE UP (ref 7–23)
CALCIUM SERPL-MCNC: 9.6 MG/DL — SIGNIFICANT CHANGE UP (ref 8.4–10.5)
CHLORIDE SERPL-SCNC: 105 MMOL/L — SIGNIFICANT CHANGE UP (ref 98–107)
CO2 SERPL-SCNC: 22 MMOL/L — SIGNIFICANT CHANGE UP (ref 22–31)
CREAT SERPL-MCNC: 0.69 MG/DL — SIGNIFICANT CHANGE UP (ref 0.5–1.3)
EOSINOPHIL # BLD AUTO: 0.04 K/UL — SIGNIFICANT CHANGE UP (ref 0–0.5)
EOSINOPHIL NFR BLD AUTO: 0.7 % — SIGNIFICANT CHANGE UP (ref 0–6)
GLUCOSE SERPL-MCNC: 93 MG/DL — SIGNIFICANT CHANGE UP (ref 70–99)
HCT VFR BLD CALC: 35.1 % — SIGNIFICANT CHANGE UP (ref 34.5–45)
HGB BLD-MCNC: 9.7 G/DL — LOW (ref 11.5–15.5)
IANC: 3.09 K/UL — SIGNIFICANT CHANGE UP (ref 1.5–8.5)
IMM GRANULOCYTES NFR BLD AUTO: 0.6 % — SIGNIFICANT CHANGE UP (ref 0–1.5)
LYMPHOCYTES # BLD AUTO: 1.88 K/UL — SIGNIFICANT CHANGE UP (ref 1–3.3)
LYMPHOCYTES # BLD AUTO: 34.6 % — SIGNIFICANT CHANGE UP (ref 13–44)
MCHC RBC-ENTMCNC: 19.4 PG — LOW (ref 27–34)
MCHC RBC-ENTMCNC: 27.6 GM/DL — LOW (ref 32–36)
MCV RBC AUTO: 70.3 FL — LOW (ref 80–100)
MONOCYTES # BLD AUTO: 0.37 K/UL — SIGNIFICANT CHANGE UP (ref 0–0.9)
MONOCYTES NFR BLD AUTO: 6.8 % — SIGNIFICANT CHANGE UP (ref 2–14)
NEUTROPHILS # BLD AUTO: 3.09 K/UL — SIGNIFICANT CHANGE UP (ref 1.8–7.4)
NEUTROPHILS NFR BLD AUTO: 56.7 % — SIGNIFICANT CHANGE UP (ref 43–77)
NRBC # BLD: 0 /100 WBCS — SIGNIFICANT CHANGE UP
NRBC # FLD: 0 K/UL — SIGNIFICANT CHANGE UP
PLATELET # BLD AUTO: 181 K/UL — SIGNIFICANT CHANGE UP (ref 150–400)
POTASSIUM SERPL-MCNC: 4 MMOL/L — SIGNIFICANT CHANGE UP (ref 3.5–5.3)
POTASSIUM SERPL-SCNC: 4 MMOL/L — SIGNIFICANT CHANGE UP (ref 3.5–5.3)
RBC # BLD: 4.99 M/UL — SIGNIFICANT CHANGE UP (ref 3.8–5.2)
RBC # FLD: 19.4 % — HIGH (ref 10.3–14.5)
SODIUM SERPL-SCNC: 141 MMOL/L — SIGNIFICANT CHANGE UP (ref 135–145)
WBC # BLD: 5.44 K/UL — SIGNIFICANT CHANGE UP (ref 3.8–10.5)
WBC # FLD AUTO: 5.44 K/UL — SIGNIFICANT CHANGE UP (ref 3.8–10.5)

## 2021-10-22 PROCEDURE — 99284 EMERGENCY DEPT VISIT MOD MDM: CPT

## 2021-10-22 NOTE — ED PROVIDER NOTE - OBJECTIVE STATEMENT
46F PMH hypothyroidism CC numbness and tingling, blurry vision. PT states she noted blurry vision started a week ago which gets better when she wears her glasses. Had visual acuity checked approx in april w/ a change in her prescription. PT states no pain in her eyes. Numbness in arms and legs sporidic, lasts 5-10 minutes when it occurs and happens more so on her right arm. PT currently does not have any numbness.  Currently on estrogen for vaginal bleeding, not endorsing any vaginal bleeding currently. Currently receiving iron transfusions for low iron, on her 5th treatment.   Denies any fever, chest pain , SOB, headache dizziness, lightheadedness

## 2021-10-22 NOTE — ED PROVIDER NOTE - PHYSICAL EXAMINATION
GENERAL: Awake, alert, NAD  HEENT:  PERRL, EOMI, visual acuity 20/20 b/l   LUNGS: CTAB, no wheezes or crackles   CARDIAC: RRR, no m/r/g  ABDOMEN: Soft,, non tender, non distended, no rebound, no guarding  BACK: No midline spinal tenderness, negative spurlings, neurovascular intact b/l in four extremities   EXT: No edema,   NEURO: alert, sensation intact throughout, coordination shows no abnormalities  motor 5/5 RUE/LUE/RLE/LLE/EHL/Plantar flexion, gait nl   SKIN: Warm and dry. No rash.  PSYCH: Normal affect.

## 2021-10-22 NOTE — ED PROVIDER NOTE - PATIENT PORTAL LINK FT
You can access the FollowMyHealth Patient Portal offered by [facility] by registering at the following website: [email or web address]. By joining [employer]’s FollowMyHealth portal, you will also be able to view your health information using other applications (apps) compatible with our system.

## 2021-10-22 NOTE — ED ADULT TRIAGE NOTE - CHIEF COMPLAINT QUOTE
pt c/o 3 days of blurry vision to both eyes and numbness/tingling to bilateral arms. PMH hypothyroidism. Last iron transfusion last week. Denies CP, SOB. pt c/o 3 days of blurry vision to both eyes and numbness/tingling to bilateral arms. PMH hypothyroidism, anemia. Last iron transfusion last week. Denies CP, SOB.

## 2021-10-22 NOTE — ED PROVIDER NOTE - ATTENDING CONTRIBUTION TO CARE
I performed a history and physical exam of the patient and discussed their management with the resident.  I reviewed the resident's note and agree with the documented findings and plan of care except as noted below. My medical decision making and observations are as follows:    46F PMH anemia receiving iron transfusions, on estrogen for heavy vaginal bleeding CC blurry vision x 1 week and numbness in extremities intermittently.  Vision improves with wearing her glasses.  numbness is intermittent episodes on all extremities for a few minutes.  No associated weakness, chest pain, shortness of breath, f/c.  Pt endorsing significant stress with loss of her father recently and caring for disabled son as well as young 6 yr old.  Pt in NAD, heart rrr, lungs cta, PERRL, EOMI, 5/5 strength and equal sensation all extremities, normal gait.  will check labs to eval for anemia or electrolyte abnormalities, and if normal patient can follow up out patient.  - Clover Trevino, DO

## 2021-10-22 NOTE — ED ADULT NURSE NOTE - OBJECTIVE STATEMENT
Pt. is A&Ox4 ambulatory presents to ER c/o numbness/tingling to bilateral arms and legs and blurry vision for 3 days. Denies numbness/tingling at this time, denies fever, SOB, N/V/D, and CP. PMH hypothyroidism and anemia, states last iron infusion last Wednesday. Respirations even and unlabored. 20G IV obtained in RAC, labs obtained, flushing without resistance, dressing dry and intact. Safety maintained.

## 2021-10-22 NOTE — ED PROVIDER NOTE - NSFOLLOWUPINSTRUCTIONS_ED_ALL_ED_FT
[facility]  Neuroscience [place] at [place]  [address], [address]  [place] [address]  [phone]      No signs of emergency medical condition on today's workup.  Presumptive diagnosis made, but further evaluation may be required by your primary care doctor or specialist for a definitive diagnosis.  Therefore, follow up as directed and if symptoms change/worsen or any emergency conditions, please return to the ER. Please follow up with neurology, the information is below to make an appointment.   Your hemoglobin was 9.7, which is up from 7.9 previously in september.  You can take Tylenol and Motrin every 8 hours for pain as needed.     [facility]  Neuroscience [place] at [place]  [address], [address]  [place], [place] [address] [phone]      No signs of emergency medical condition on today's workup.  Presumptive diagnosis made, but further evaluation may be required by your primary care doctor or specialist for a definitive diagnosis.  Therefore, follow up as directed and if symptoms change/worsen or any emergency conditions, please return to the ER.

## 2021-10-22 NOTE — ED ADULT NURSE NOTE - NS ED NURSE RECORD ANOTHER VITAL SIGN
Impression: Keratoconjunctivitis sicca, bilateral: K10.827. Plan: Cont ATs QID OU. Educated pt. on need for regular treatment and F/U. Yes

## 2021-10-22 NOTE — ED ADULT NURSE NOTE - CHIEF COMPLAINT QUOTE
pt c/o 3 days of blurry vision to both eyes and numbness/tingling to bilateral arms. PMH hypothyroidism, anemia. Last iron transfusion last week. Denies CP, SOB.

## 2021-10-22 NOTE — ED PROVIDER NOTE - CLINICAL SUMMARY MEDICAL DECISION MAKING FREE TEXT BOX
46F PMH anemia w/ receiving iron transfusions, on estrogen for heavy vaginal bleeding CC blurriness and numbness in extremities. Given hx and physical consideration for anemia, e- abnormalties, radiucapthy. Will get CBC BMP  w/ neuro follow up for radiculopathy

## 2021-11-17 NOTE — PATIENT PROFILE OB - AS SC BRADEN NUTRITION
Denies known Latex allergy or symptoms of Latex sensitivity.   Medications reviewed with patient:  No changes made.  Tobacco use verified.  Medical and Surgical history reviewed:  No changes made.      Preferred Pharmacy:    [id number] - [facility] [address]  [address] [address]  Phone: [phone] Fax: [phone]    [id number] - [facility] [address] AT [facility] [address]  [address] [address]  Phone: [phone] Fax: [phone]        Refills needed?  no  Letter for work/school needed?  no    Chief Complaint   Patient presents with   • Office Visit     left leg follow up with mri results                (4) excellent

## 2021-11-26 PROBLEM — E03.9 HYPOTHYROIDISM, UNSPECIFIED: Chronic | Status: ACTIVE | Noted: 2021-10-22

## 2021-12-06 ENCOUNTER — OUTPATIENT (OUTPATIENT)
Dept: OUTPATIENT SERVICES | Facility: HOSPITAL | Age: 46
LOS: 1 days | End: 2021-12-06
Payer: COMMERCIAL

## 2021-12-06 VITALS
WEIGHT: 138.01 LBS | HEIGHT: 64 IN | HEART RATE: 80 BPM | TEMPERATURE: 98 F | DIASTOLIC BLOOD PRESSURE: 77 MMHG | RESPIRATION RATE: 16 BRPM | OXYGEN SATURATION: 100 % | SYSTOLIC BLOOD PRESSURE: 125 MMHG

## 2021-12-06 DIAGNOSIS — Z98.891 HISTORY OF UTERINE SCAR FROM PREVIOUS SURGERY: Chronic | ICD-10-CM

## 2021-12-06 DIAGNOSIS — D50.0 IRON DEFICIENCY ANEMIA SECONDARY TO BLOOD LOSS (CHRONIC): ICD-10-CM

## 2021-12-06 DIAGNOSIS — N93.8 OTHER SPECIFIED ABNORMAL UTERINE AND VAGINAL BLEEDING: ICD-10-CM

## 2021-12-06 DIAGNOSIS — N93.9 ABNORMAL UTERINE AND VAGINAL BLEEDING, UNSPECIFIED: ICD-10-CM

## 2021-12-06 DIAGNOSIS — Z01.818 ENCOUNTER FOR OTHER PREPROCEDURAL EXAMINATION: ICD-10-CM

## 2021-12-06 LAB
ANION GAP SERPL CALC-SCNC: 4 MMOL/L — LOW (ref 5–17)
BUN SERPL-MCNC: 8 MG/DL — SIGNIFICANT CHANGE UP (ref 7–23)
CALCIUM SERPL-MCNC: 9 MG/DL — SIGNIFICANT CHANGE UP (ref 8.5–10.1)
CHLORIDE SERPL-SCNC: 110 MMOL/L — HIGH (ref 96–108)
CO2 SERPL-SCNC: 28 MMOL/L — SIGNIFICANT CHANGE UP (ref 22–31)
CREAT SERPL-MCNC: 0.83 MG/DL — SIGNIFICANT CHANGE UP (ref 0.5–1.3)
GLUCOSE SERPL-MCNC: 93 MG/DL — SIGNIFICANT CHANGE UP (ref 70–99)
HCG SERPL-ACNC: <1 MIU/ML — SIGNIFICANT CHANGE UP
HCT VFR BLD CALC: 34.6 % — SIGNIFICANT CHANGE UP (ref 34.5–45)
HGB BLD-MCNC: 10.3 G/DL — LOW (ref 11.5–15.5)
MCHC RBC-ENTMCNC: 22.3 PG — LOW (ref 27–34)
MCHC RBC-ENTMCNC: 29.8 GM/DL — LOW (ref 32–36)
MCV RBC AUTO: 75.1 FL — LOW (ref 80–100)
NRBC # BLD: 0 /100 WBCS — SIGNIFICANT CHANGE UP (ref 0–0)
PLATELET # BLD AUTO: 193 K/UL — SIGNIFICANT CHANGE UP (ref 150–400)
POTASSIUM SERPL-MCNC: 3.8 MMOL/L — SIGNIFICANT CHANGE UP (ref 3.5–5.3)
POTASSIUM SERPL-SCNC: 3.8 MMOL/L — SIGNIFICANT CHANGE UP (ref 3.5–5.3)
RBC # BLD: 4.61 M/UL — SIGNIFICANT CHANGE UP (ref 3.8–5.2)
RBC # FLD: 22.3 % — HIGH (ref 10.3–14.5)
SODIUM SERPL-SCNC: 142 MMOL/L — SIGNIFICANT CHANGE UP (ref 135–145)
WBC # BLD: 5.52 K/UL — SIGNIFICANT CHANGE UP (ref 3.8–10.5)
WBC # FLD AUTO: 5.52 K/UL — SIGNIFICANT CHANGE UP (ref 3.8–10.5)

## 2021-12-06 PROCEDURE — 36415 COLL VENOUS BLD VENIPUNCTURE: CPT

## 2021-12-06 PROCEDURE — 86850 RBC ANTIBODY SCREEN: CPT

## 2021-12-06 PROCEDURE — G0463: CPT

## 2021-12-06 PROCEDURE — 84702 CHORIONIC GONADOTROPIN TEST: CPT

## 2021-12-06 PROCEDURE — 86900 BLOOD TYPING SEROLOGIC ABO: CPT

## 2021-12-06 PROCEDURE — 85027 COMPLETE CBC AUTOMATED: CPT

## 2021-12-06 PROCEDURE — 86901 BLOOD TYPING SEROLOGIC RH(D): CPT

## 2021-12-06 PROCEDURE — 80048 BASIC METABOLIC PNL TOTAL CA: CPT

## 2021-12-06 NOTE — H&P PST ADULT - HISTORY OF PRESENT ILLNESS
45 y/o healthy female with c/o of irregular and heavy menstruation, gets iron infusions once a week.  LMP11/23/21, Was seen by Dr Scott about 2 weeks ago in the office. Sonogram done, diagnosed with uterine fibroid. Scheduled  for D&C, hysteroscopy with suction on 12/17/21.  Pre op testing today.   47 y/o healthy female with c/o of irregular and heavy menstruation, gets iron infusions once a week.  LMP11/23/21, Was seen by Dr Giraldo about 2 weeks ago in the office. Sonogram done, diagnosed with uterine fibroid. Scheduled  for D&C, hysteroscopy with suction on 12/17/21.  Pre op testing today.

## 2021-12-06 NOTE — H&P PST ADULT - PROBLEM SELECTOR PLAN 1
Scheduled  for D&C, hysteroscopy with suction on 12/17/21.  Pre op testing today.  Pre op instructions:    Advised to see hematology pre op   Vaccinated for Covid  Hold OTC supplements.   May take Tylenol for pain or headache if needed.    NPO after 11pm to the morning of surgery.   Covid testing advised 3 days prior to procedure, information given.  Patient verbalized understanding.

## 2021-12-06 NOTE — H&P PST ADULT - FALL HARM RISK - UNIVERSAL INTERVENTIONS
Bed in lowest position, wheels locked, appropriate side rails in place/Call bell, personal items and telephone in reach/Non-slip footwear when patient is out of bed/[place] to call system/Physically safe environment - no spills, clutter or unnecessary equipment/Purposeful Proactive Rounding/Room/bathroom lighting operational, light cord in reach

## 2021-12-14 ENCOUNTER — TRANSCRIPTION ENCOUNTER (OUTPATIENT)
Age: 46
End: 2021-12-14

## 2021-12-16 ENCOUNTER — TRANSCRIPTION ENCOUNTER (OUTPATIENT)
Age: 46
End: 2021-12-16

## 2021-12-16 NOTE — ASU PATIENT PROFILE, ADULT - FALL HARM RISK - UNIVERSAL INTERVENTIONS
Bed in lowest position, wheels locked, appropriate side rails in place/Call bell, personal items and telephone in reach/Instruct patient to call for assistance before getting out of bed or chair/Non-slip footwear when patient is out of bed/[place] to call system/Physically safe environment - no spills, clutter or unnecessary equipment/Purposeful Proactive Rounding/Room/bathroom lighting operational, light cord in reach

## 2021-12-17 ENCOUNTER — OUTPATIENT (OUTPATIENT)
Dept: OUTPATIENT SERVICES | Facility: HOSPITAL | Age: 46
LOS: 1 days | End: 2021-12-17
Payer: COMMERCIAL

## 2021-12-17 ENCOUNTER — RESULT REVIEW (OUTPATIENT)
Age: 46
End: 2021-12-17

## 2021-12-17 VITALS
SYSTOLIC BLOOD PRESSURE: 118 MMHG | TEMPERATURE: 98 F | HEART RATE: 77 BPM | OXYGEN SATURATION: 100 % | RESPIRATION RATE: 18 BRPM | DIASTOLIC BLOOD PRESSURE: 78 MMHG

## 2021-12-17 VITALS
RESPIRATION RATE: 18 BRPM | HEART RATE: 70 BPM | SYSTOLIC BLOOD PRESSURE: 111 MMHG | OXYGEN SATURATION: 100 % | DIASTOLIC BLOOD PRESSURE: 68 MMHG

## 2021-12-17 DIAGNOSIS — D50.0 IRON DEFICIENCY ANEMIA SECONDARY TO BLOOD LOSS (CHRONIC): ICD-10-CM

## 2021-12-17 DIAGNOSIS — N93.9 ABNORMAL UTERINE AND VAGINAL BLEEDING, UNSPECIFIED: ICD-10-CM

## 2021-12-17 DIAGNOSIS — Z98.891 HISTORY OF UTERINE SCAR FROM PREVIOUS SURGERY: Chronic | ICD-10-CM

## 2021-12-17 LAB — HCG UR QL: NEGATIVE — SIGNIFICANT CHANGE UP

## 2021-12-17 PROCEDURE — 88305 TISSUE EXAM BY PATHOLOGIST: CPT

## 2021-12-17 PROCEDURE — 88305 TISSUE EXAM BY PATHOLOGIST: CPT | Mod: 26

## 2021-12-17 PROCEDURE — 58558 HYSTEROSCOPY BIOPSY: CPT

## 2021-12-17 PROCEDURE — 81025 URINE PREGNANCY TEST: CPT

## 2021-12-17 RX ORDER — CHOLECALCIFEROL (VITAMIN D3) 125 MCG
1 CAPSULE ORAL
Qty: 0 | Refills: 0 | DISCHARGE

## 2021-12-17 RX ORDER — ONDANSETRON 8 MG/1
4 TABLET, FILM COATED ORAL ONCE
Refills: 0 | Status: DISCONTINUED | OUTPATIENT
Start: 2021-12-17 | End: 2021-12-17

## 2021-12-17 RX ORDER — SODIUM CHLORIDE 9 MG/ML
1000 INJECTION, SOLUTION INTRAVENOUS
Refills: 0 | Status: DISCONTINUED | OUTPATIENT
Start: 2021-12-17 | End: 2021-12-17

## 2021-12-17 RX ORDER — OXYCODONE HYDROCHLORIDE 5 MG/1
5 TABLET ORAL ONCE
Refills: 0 | Status: DISCONTINUED | OUTPATIENT
Start: 2021-12-17 | End: 2021-12-17

## 2021-12-17 RX ORDER — ALUMINUM HYDROXIDE AND MAGNESIUM TRISILICATE 80; 14.2 MG/1; MG/1
2 TABLET, CHEWABLE ORAL
Qty: 0 | Refills: 0 | DISCHARGE

## 2021-12-17 RX ORDER — HYDROMORPHONE HYDROCHLORIDE 2 MG/ML
0.5 INJECTION INTRAMUSCULAR; INTRAVENOUS; SUBCUTANEOUS
Refills: 0 | Status: DISCONTINUED | OUTPATIENT
Start: 2021-12-17 | End: 2021-12-17

## 2021-12-17 RX ORDER — ACETAMINOPHEN 500 MG
650 TABLET ORAL ONCE
Refills: 0 | Status: COMPLETED | OUTPATIENT
Start: 2021-12-17 | End: 2021-12-17

## 2021-12-17 RX ORDER — FAMOTIDINE 10 MG/ML
0 INJECTION INTRAVENOUS
Qty: 0 | Refills: 0 | DISCHARGE

## 2021-12-17 RX ORDER — FOLIC ACID 0.8 MG
0 TABLET ORAL
Qty: 0 | Refills: 0 | DISCHARGE

## 2021-12-17 RX ADMIN — Medication 650 MILLIGRAM(S): at 06:55

## 2021-12-17 RX ADMIN — SODIUM CHLORIDE 75 MILLILITER(S): 9 INJECTION, SOLUTION INTRAVENOUS at 06:55

## 2021-12-17 NOTE — ASU DISCHARGE PLAN (ADULT/PEDIATRIC) - NS MD DC FALL RISK RISK
For information on Fall & Injury Prevention, visit: [email or web address] OR  [email or web address] OR  https://www.cdc.gov/steadi/patient.html

## 2021-12-17 NOTE — BRIEF OPERATIVE NOTE - OPERATION/FINDINGS
8 week size retroverted uterus   2 cm endometrial polyp   EMC collected   pictures obtained  b/l ostia visualized   fluid deficit 200cc

## 2021-12-17 NOTE — ASU DISCHARGE PLAN (ADULT/PEDIATRIC) - CARE PROVIDER_API CALL
Farida Giraldo)  Obstetrics and Gynecology  [address] [place] [address]  Phone: [phone]  Fax: [phone]  Follow Up Time: 1 month

## 2021-12-17 NOTE — ASU PREOP CHECKLIST - BP NONINVASIVE DIASTOLIC (MM HG)
Ms. Webster arrived at clinic for Cyanocobalamin injection given Intramuscular at Left deltoid.      Injection was given without incidence.      Annie GÓMEZ, ЕЛЕНА/CMT....................2:36 PM    
78

## 2022-02-09 NOTE — ED PROVIDER NOTE - ABNORMAL RHYTHM
Physical Therapy  Cash Based Dry Needling Session    Dry Needling Informed Consent Signed: Yes  Patient has been made aware of the cash based cost associated with each of the above procedures: Yes    SUBJECTIVE   patient states that she is having soreness in both sides of her neck.    OBJECTIVE    tenderness to palpation of bilateral upper trapezius.    Treatment   Education about indications, contraindications and potential side effects completed with patient.  Screen Completed   Precautions Contraindications   Local skin lesions  Lyme disease  Local lymphedema  Severe hyperalgesia/allodynia  Metal allergies: nickel and chromium  Abnormal bleeding tendency  Immunodeficiency and/or compromised immune system  Second or third trimester of pregnancy  Vascular Disease  History of spontaneous pneumothorax Local or systemic infections; including the flu  Over implants  Active cancer  Area of lymphatic compromise  Area of lumpectomy/mastectomy  First trimester of pregnancy     Patient has consented to proceed with the intervention.    Dry Needling:   Needles inserted 2   Needles removed 2   Locations and Needle Size 50 mm bilateral upper trapezius   Treatment duration 15   Patient response Soreness     Time Out performed at start of session, with patient verifying procedure and consent prior to performing the procedure.    Sign Out performed at end of session, with patient verifying procedure performed and addressing specimens if applicable upon completion of the procedure.    ASSESSMENT AND FUTURE RECOMMENDATIONS    Response to treatment: soreness  Re-assessment of dysfunctions following intervention demonstrates: soreness    Based on the above recommendation is to: Continue Cash Based Service    CASH BASED THERAPY DAILY BILLING   Untimed Procedures:  Dry Needling - Unlisted Physical Medicine/Rehabilitation Service Or Procedure  Total time spent with patient: 15 minutes    
sinus arrhythmia

## 2022-06-01 NOTE — H&P PST ADULT - LAST STRESS TEST
Care Transitions Outreach Attempt    Patient: Emma Renteria Patient : 1943 MRN: [id number] Reason for Admission: Rt Groin/Thigh Pain  Discharge Date: 22       RARS:14    Last Discharge [facility]       Complaint Diagnosis Description Type Department Provider    22  COPD, severe [facility]) Admission (Discharged) Freddy Eisenberg MD      Noted following upcoming appointments from discharge chart review:   [facility] follow up appointment(s):   Future Appointments   Date Time Provider Andre Oakes   2022  2:00 PM MD Estuardo Isaacs S IM [facility]   2022 11:00 AM [facility] MED ONC NURSE [facility] MED ONC [facility]   2022 11:15 AM MD Estuardo Valles CC [facility]   2022  9:30 AM MD Estuardo Valles CC [facility]   2022  9:30 AM MD Estuardo Garcia PULM [facility]   2022  8:00 AM MD Estuardo Isaacs E S IM [facility]     Attempt to reach for Care Transition follow up call unsuccessful; no answer, vm full.  [address] [place] [address], [facility] [phone]
none

## 2023-02-19 ENCOUNTER — NON-APPOINTMENT (OUTPATIENT)
Age: 48
End: 2023-02-19

## 2023-07-07 NOTE — H&P PST ADULT - HEIGHT IN FEET
Pt came in for a bp check. See notes from visit. Pt is in agreement to starting a low dose bp medication if you recommend.   Does patient have record of home blood pressure readings no.      Patient is asymptomatic.      BP: (!) 150/86 , Pulse: 68 .     Blood pressure reading after 15 minutes was 138/84, Pulse 61.  Dr. Kaplan notified.    5

## 2023-11-03 NOTE — H&P PST ADULT - AIRWAY
Composite Filling #13 MOD    Wilner Del Cid presents for composite filling. Patient consent treatment. PMH reviewed, no changes. ASA: II  Pain score: 3  Chief complain: "My upper left back tooth is hurting"  DX: Tooth #13 MOD deep caries. Percussion (+) and endo ice test is reacting. Possible need of RCT. Discussed with patient need for RCT if pulp exposure occurs or in future if pulp is inflamed. Pt understands and consents. Applied topical benzocaine, administered one carp 2% lido 1:100k epi via maxillary infiltration injection. Prepped tooth #13 MOD with 245 kam on high speed. Caries removed with round carbide on slow speed and used spoon excavator. Deep caries but no pulp exposure. Placed Thurman matrix. Isolation with cotton rolls and dri-angles  Applied Lime glass ionomer base at deep mesial axial wall and light cured for 20 seconds. Etch with 37% H2PO4, rinse, dry. Applied Adhese with 20 second scrub once, gentle air dry and light cured for 10s. Restored with Tetric bulk nate shade A2 and light cured. Refined with finishing burs, polished with enhance point. Verified occlusion and contacts. POI is given. Patient made ware at beginning of procedure if tooth persist the symptoms then root canal therapy will be needed. Pt left satisfied and ambulatory. Patient is referred to [facility] for extractions of teeth #1,3,16,17,32 per proposed treatment plan and patient request.   NV: Restoration tooth #12. normal

## 2024-09-05 NOTE — ASU PATIENT PROFILE, ADULT - NS PRO MODE OF ARRIVAL
Behavioral Health Interdisciplinary Rounds     Patient Name: Sohail Gary  Age: 39 y.o.  Room/Bed:  6/  Primary Diagnosis: MDD (major depressive disorder), recurrent episode, mild (HCC)   Admission Status: Voluntary    Readmission within 30 days: No  Power of  in place: No  Patient requires a blocked bed: Yes          Reason for blocked bed:   Sleep hours:  Slept in chair with frequent changes In positioning. 6 hours total      Participation in Care/Groups:  Yes  Medication Compliant?: Yes  PRNS (last 24 hours): Antianxiety   Restraints (last 24 hours):  No  __________________________________________________    24 hour chart check complete: Yes    _______________________________________________    Patient goal(s) for today: patient is requesting discharge   Treatment team focus/goals: Plan to discharge   Progress note: She is requesting discharge today, feels her medical concerns are not being addressed - it will be an AMA discharge      Spiritual Care Consult:   Financial concerns/prescription coverage:  medicaid  Family contact:                         Family requesting physician contact today:    Discharge plan: she will return home with family   Access to weapons : no                                                             Outpatient provider(s): EVELIO     LOS:  1  Expected LOS: today     Participating treatment team members: Sohail NILES Cookie, ROBYN Dorantes- Dr. Sergio Cr RN - Maya Duron,MSW Student        
ambulatory

## 2024-09-28 ENCOUNTER — INPATIENT (INPATIENT)
Facility: HOSPITAL | Age: 49
LOS: 0 days | Discharge: HOME | End: 2024-09-29
Attending: INTERNAL MEDICINE | Admitting: INTERNAL MEDICINE
Payer: COMMERCIAL

## 2024-09-28 VITALS
WEIGHT: 145.06 LBS | RESPIRATION RATE: 17 BRPM | OXYGEN SATURATION: 99 % | SYSTOLIC BLOOD PRESSURE: 110 MMHG | HEART RATE: 113 BPM | DIASTOLIC BLOOD PRESSURE: 77 MMHG | TEMPERATURE: 98 F | HEIGHT: 65 IN

## 2024-09-28 DIAGNOSIS — D62 ACUTE POSTHEMORRHAGIC ANEMIA: ICD-10-CM

## 2024-09-28 DIAGNOSIS — D64.9 ANEMIA, UNSPECIFIED: ICD-10-CM

## 2024-09-28 DIAGNOSIS — Z98.891 HISTORY OF UTERINE SCAR FROM PREVIOUS SURGERY: Chronic | ICD-10-CM

## 2024-09-28 DIAGNOSIS — Z29.9 ENCOUNTER FOR PROPHYLACTIC MEASURES, UNSPECIFIED: ICD-10-CM

## 2024-09-28 DIAGNOSIS — N93.9 ABNORMAL UTERINE AND VAGINAL BLEEDING, UNSPECIFIED: ICD-10-CM

## 2024-09-28 LAB
ALBUMIN SERPL ELPH-MCNC: 3.2 G/DL — LOW (ref 3.3–5)
ALP SERPL-CCNC: 48 U/L — SIGNIFICANT CHANGE UP (ref 40–120)
ALT FLD-CCNC: 37 U/L — SIGNIFICANT CHANGE UP (ref 12–78)
ANION GAP SERPL CALC-SCNC: 4 MMOL/L — LOW (ref 5–17)
ANISOCYTOSIS BLD QL: SLIGHT — SIGNIFICANT CHANGE UP
ANISOCYTOSIS BLD QL: SLIGHT — SIGNIFICANT CHANGE UP
APPEARANCE UR: CLEAR — SIGNIFICANT CHANGE UP
AST SERPL-CCNC: 21 U/L — SIGNIFICANT CHANGE UP (ref 15–37)
BACTERIA # UR AUTO: ABNORMAL /HPF
BASOPHILS # BLD AUTO: 0.02 K/UL — SIGNIFICANT CHANGE UP (ref 0–0.2)
BASOPHILS # BLD AUTO: 0.03 K/UL — SIGNIFICANT CHANGE UP (ref 0–0.2)
BASOPHILS NFR BLD AUTO: 0.3 % — SIGNIFICANT CHANGE UP (ref 0–2)
BASOPHILS NFR BLD AUTO: 0.4 % — SIGNIFICANT CHANGE UP (ref 0–2)
BILIRUB SERPL-MCNC: 0.2 MG/DL — SIGNIFICANT CHANGE UP (ref 0.2–1.2)
BILIRUB UR-MCNC: NEGATIVE — SIGNIFICANT CHANGE UP
BLD GP AB SCN SERPL QL: SIGNIFICANT CHANGE UP
BUN SERPL-MCNC: 7 MG/DL — SIGNIFICANT CHANGE UP (ref 7–23)
CALCIUM SERPL-MCNC: 8.6 MG/DL — SIGNIFICANT CHANGE UP (ref 8.5–10.1)
CHLORIDE SERPL-SCNC: 111 MMOL/L — HIGH (ref 96–108)
CO2 SERPL-SCNC: 27 MMOL/L — SIGNIFICANT CHANGE UP (ref 22–31)
COLOR SPEC: YELLOW — SIGNIFICANT CHANGE UP
CREAT SERPL-MCNC: 0.76 MG/DL — SIGNIFICANT CHANGE UP (ref 0.5–1.3)
DACRYOCYTES BLD QL SMEAR: SLIGHT — SIGNIFICANT CHANGE UP
DIFF PNL FLD: ABNORMAL
EGFR: 97 ML/MIN/1.73M2 — SIGNIFICANT CHANGE UP
EOSINOPHIL # BLD AUTO: 0.06 K/UL — SIGNIFICANT CHANGE UP (ref 0–0.5)
EOSINOPHIL # BLD AUTO: 0.06 K/UL — SIGNIFICANT CHANGE UP (ref 0–0.5)
EOSINOPHIL NFR BLD AUTO: 0.7 % — SIGNIFICANT CHANGE UP (ref 0–6)
EOSINOPHIL NFR BLD AUTO: 0.8 % — SIGNIFICANT CHANGE UP (ref 0–6)
EPI CELLS # UR: PRESENT
GLUCOSE SERPL-MCNC: 106 MG/DL — HIGH (ref 70–99)
GLUCOSE UR QL: NEGATIVE MG/DL — SIGNIFICANT CHANGE UP
HCG SERPL-ACNC: <1 MIU/ML — SIGNIFICANT CHANGE UP
HCT VFR BLD CALC: 20.4 % — CRITICAL LOW (ref 34.5–45)
HCT VFR BLD CALC: 20.7 % — CRITICAL LOW (ref 34.5–45)
HGB BLD-MCNC: 6.3 G/DL — CRITICAL LOW (ref 11.5–15.5)
HGB BLD-MCNC: 6.5 G/DL — CRITICAL LOW (ref 11.5–15.5)
HYPOCHROMIA BLD QL: SLIGHT — SIGNIFICANT CHANGE UP
IMM GRANULOCYTES NFR BLD AUTO: 1.1 % — HIGH (ref 0–0.9)
IMM GRANULOCYTES NFR BLD AUTO: 1.7 % — HIGH (ref 0–0.9)
KETONES UR-MCNC: NEGATIVE MG/DL — SIGNIFICANT CHANGE UP
LEUKOCYTE ESTERASE UR-ACNC: ABNORMAL
LYMPHOCYTES # BLD AUTO: 2.37 K/UL — SIGNIFICANT CHANGE UP (ref 1–3.3)
LYMPHOCYTES # BLD AUTO: 2.47 K/UL — SIGNIFICANT CHANGE UP (ref 1–3.3)
LYMPHOCYTES # BLD AUTO: 29.8 % — SIGNIFICANT CHANGE UP (ref 13–44)
LYMPHOCYTES # BLD AUTO: 30.3 % — SIGNIFICANT CHANGE UP (ref 13–44)
MANUAL SMEAR VERIFICATION: SIGNIFICANT CHANGE UP
MANUAL SMEAR VERIFICATION: SIGNIFICANT CHANGE UP
MCHC RBC-ENTMCNC: 26.9 PG — LOW (ref 27–34)
MCHC RBC-ENTMCNC: 27 PG — SIGNIFICANT CHANGE UP (ref 27–34)
MCHC RBC-ENTMCNC: 30.9 G/DL — LOW (ref 32–36)
MCHC RBC-ENTMCNC: 31.6 G/DL — LOW (ref 32–36)
MCV RBC AUTO: 85.7 FL — SIGNIFICANT CHANGE UP (ref 80–100)
MCV RBC AUTO: 87.2 FL — SIGNIFICANT CHANGE UP (ref 80–100)
MICROCYTES BLD QL: SLIGHT — SIGNIFICANT CHANGE UP
MONOCYTES # BLD AUTO: 0.37 K/UL — SIGNIFICANT CHANGE UP (ref 0–0.9)
MONOCYTES # BLD AUTO: 0.61 K/UL — SIGNIFICANT CHANGE UP (ref 0–0.9)
MONOCYTES NFR BLD AUTO: 4.7 % — SIGNIFICANT CHANGE UP (ref 2–14)
MONOCYTES NFR BLD AUTO: 7.4 % — SIGNIFICANT CHANGE UP (ref 2–14)
NEUTROPHILS # BLD AUTO: 4.87 K/UL — SIGNIFICANT CHANGE UP (ref 1.8–7.4)
NEUTROPHILS # BLD AUTO: 5.02 K/UL — SIGNIFICANT CHANGE UP (ref 1.8–7.4)
NEUTROPHILS NFR BLD AUTO: 60.6 % — SIGNIFICANT CHANGE UP (ref 43–77)
NEUTROPHILS NFR BLD AUTO: 62.2 % — SIGNIFICANT CHANGE UP (ref 43–77)
NITRITE UR-MCNC: NEGATIVE — SIGNIFICANT CHANGE UP
NRBC # BLD: 0 /100 WBCS — SIGNIFICANT CHANGE UP (ref 0–0)
NRBC # BLD: 1 /100 WBCS — HIGH (ref 0–0)
NT-PROBNP SERPL-SCNC: 37 PG/ML — SIGNIFICANT CHANGE UP (ref 0–125)
OVALOCYTES BLD QL SMEAR: SLIGHT — SIGNIFICANT CHANGE UP
PH UR: 6.5 — SIGNIFICANT CHANGE UP (ref 5–8)
PLAT MORPH BLD: NORMAL — SIGNIFICANT CHANGE UP
PLAT MORPH BLD: NORMAL — SIGNIFICANT CHANGE UP
PLATELET # BLD AUTO: 134 K/UL — LOW (ref 150–400)
PLATELET # BLD AUTO: 162 K/UL — SIGNIFICANT CHANGE UP (ref 150–400)
POIKILOCYTOSIS BLD QL AUTO: SLIGHT — SIGNIFICANT CHANGE UP
POLYCHROMASIA BLD QL SMEAR: SIGNIFICANT CHANGE UP
POLYCHROMASIA BLD QL SMEAR: SLIGHT — SIGNIFICANT CHANGE UP
POTASSIUM SERPL-MCNC: 3.5 MMOL/L — SIGNIFICANT CHANGE UP (ref 3.5–5.3)
POTASSIUM SERPL-SCNC: 3.5 MMOL/L — SIGNIFICANT CHANGE UP (ref 3.5–5.3)
PROT SERPL-MCNC: 6.4 GM/DL — SIGNIFICANT CHANGE UP (ref 6–8.3)
PROT UR-MCNC: NEGATIVE MG/DL — SIGNIFICANT CHANGE UP
RBC # BLD: 2.34 M/UL — LOW (ref 3.8–5.2)
RBC # BLD: 2.44 M/UL — LOW (ref 3.8–5.2)
RBC # FLD: 15.3 % — HIGH (ref 10.3–14.5)
RBC # FLD: 15.9 % — HIGH (ref 10.3–14.5)
RBC BLD AUTO: ABNORMAL
RBC BLD AUTO: ABNORMAL
RBC CASTS # UR COMP ASSIST: 50 /HPF — HIGH (ref 0–4)
SODIUM SERPL-SCNC: 142 MMOL/L — SIGNIFICANT CHANGE UP (ref 135–145)
SP GR SPEC: 1.01 — SIGNIFICANT CHANGE UP (ref 1–1.03)
TSH SERPL-MCNC: 0.92 UIU/ML — SIGNIFICANT CHANGE UP (ref 0.36–3.74)
UROBILINOGEN FLD QL: 0.2 MG/DL — SIGNIFICANT CHANGE UP (ref 0.2–1)
WBC # BLD: 7.82 K/UL — SIGNIFICANT CHANGE UP (ref 3.8–10.5)
WBC # BLD: 8.28 K/UL — SIGNIFICANT CHANGE UP (ref 3.8–10.5)
WBC # FLD AUTO: 7.82 K/UL — SIGNIFICANT CHANGE UP (ref 3.8–10.5)
WBC # FLD AUTO: 8.28 K/UL — SIGNIFICANT CHANGE UP (ref 3.8–10.5)
WBC UR QL: 0 /HPF — SIGNIFICANT CHANGE UP (ref 0–5)

## 2024-09-28 PROCEDURE — 99284 EMERGENCY DEPT VISIT MOD MDM: CPT

## 2024-09-28 PROCEDURE — 99285 EMERGENCY DEPT VISIT HI MDM: CPT

## 2024-09-28 PROCEDURE — 76830 TRANSVAGINAL US NON-OB: CPT | Mod: 26

## 2024-09-28 PROCEDURE — 99223 1ST HOSP IP/OBS HIGH 75: CPT

## 2024-09-28 RX ORDER — MEDROXYPROGESTERONE ACETATE 150 MG/ML
20 INJECTION, SUSPENSION INTRAMUSCULAR ONCE
Refills: 0 | Status: COMPLETED | OUTPATIENT
Start: 2024-09-28 | End: 2024-09-28

## 2024-09-28 RX ORDER — ONDANSETRON HCL/PF 4 MG/2 ML
4 VIAL (ML) INJECTION EVERY 8 HOURS
Refills: 0 | Status: DISCONTINUED | OUTPATIENT
Start: 2024-09-28 | End: 2024-09-29

## 2024-09-28 RX ORDER — 5-HYDROXYTRYPTOPHAN (5-HTP) 100 MG
3 TABLET,DISINTEGRATING ORAL AT BEDTIME
Refills: 0 | Status: DISCONTINUED | OUTPATIENT
Start: 2024-09-28 | End: 2024-09-29

## 2024-09-28 RX ORDER — ACETAMINOPHEN 325 MG
650 TABLET ORAL EVERY 6 HOURS
Refills: 0 | Status: DISCONTINUED | OUTPATIENT
Start: 2024-09-28 | End: 2024-09-29

## 2024-09-28 RX ORDER — METOCLOPRAMIDE HCL 5 MG
10 TABLET ORAL ONCE
Refills: 0 | Status: COMPLETED | OUTPATIENT
Start: 2024-09-28 | End: 2024-09-28

## 2024-09-28 RX ORDER — MAG HYDROX/ALUMINUM HYD/SIMETH 200-200-20
30 SUSPENSION, ORAL (FINAL DOSE FORM) ORAL EVERY 4 HOURS
Refills: 0 | Status: DISCONTINUED | OUTPATIENT
Start: 2024-09-28 | End: 2024-09-29

## 2024-09-28 RX ADMIN — MEDROXYPROGESTERONE ACETATE 20 MILLIGRAM(S): 150 INJECTION, SUSPENSION INTRAMUSCULAR at 17:06

## 2024-09-28 RX ADMIN — Medication 10 MILLIGRAM(S): at 16:25

## 2024-09-28 NOTE — H&P ADULT - NSHPPHYSICALEXAM_GEN_ALL_CORE
GENERAL: NAD  HEAD:  Atraumatic, Normocephalic  EYES: EOMI, conjunctival pallor  NECK: Supple, No JVD  CHEST/LUNG: Clear to auscultation bilaterally; No wheeze, rhonchi, rales  HEART: Regular rate and rhythm; Systolic murmur.  ABDOMEN: Soft, Nontender, Nondistended; Bowel sounds present  EXTREMITIES:  2+ Peripheral Pulses, No clubbing, cyanosis, or edema  PSYCH: AAOx3  NEUROLOGY: non-focal  SKIN: No rashes or lesions

## 2024-09-28 NOTE — ED PROVIDER NOTE - PHYSICAL EXAMINATION
General appearance: Nontoxic appearing, conversant, afebrile    Eyes: anicteric sclerae, MELANIE, EOMI   HENT: Atraumatic; oropharynx clear, MMM and no ulcerations, no pharyngeal erythema or exudate   Neck: Trachea midline; Full range of motion, supple   Pulm: normal respiratory effort and no intercostal retractions, normal work of breathing   Extremities: No peripheral edema, no gross deformities, FROM x4   Skin: Dry, normal temperature, turgor and texture; no rash   Psych: Appropriate affect, cooperative

## 2024-09-28 NOTE — ED ADULT NURSE REASSESSMENT NOTE - NS ED NURSE REASSESS COMMENT FT1
Pt received from Evette BUTT, resting in bed in NAD. Continuous SPO2 and cardiac monitoring in place. Blood transfusion in progress as ordered, verified with dayshift RN. Pending report for admission. Plan of care ongoing.

## 2024-09-28 NOTE — ED ADULT NURSE NOTE - OBJECTIVE STATEMENT
AAOx3 pt presents to ED c/o vaginal bleeding x since 9/16 ,Pt also c/o b/l ankle swelling and HA starting yesterday. PMH: Anemia (takes iron supplements)

## 2024-09-28 NOTE — H&P ADULT - PROBLEM SELECTOR PLAN 1
- P/w prolonged bleeding  - US pelvic = Enlarged and heterogeneous uterus suggestive of adenomyosis. There are multiple right ovarian follicles and cysts measuring up to 2.8 cm. It is unclear if this represent multiple separate simple cysts or a larger 4.5 cm complex cystic lesion. Consider further evaluation with contrast enhanced pelvic MRI in 4-6 weeks.  - Had bleeding in 2021. Endometrial sampling at that time showed Benign endometrial polyp (s), fragments, Secretory endometrium, Fragments of benign squamous mucosa.  - Baseline Hgb 13.1 most recently, per patient  - P/w Hgb 6.3 -> 1U pRBC -> 6.5  - Transfuse two more pRBCs  - Maintain 2 large bore IVs  - NPO for today  - F/u CBC q8hrs  - OBGYN on board = Outpt follow up for uterine sampling. Start Provera 20mg daily. Follow up within 7 days of discharge - P/w prolonged bleeding  - US pelvic = Enlarged and heterogeneous uterus suggestive of adenomyosis. There are multiple right ovarian follicles and cysts measuring up to 2.8 cm. It is unclear if this represent multiple separate simple cysts or a larger 4.5 cm complex cystic lesion. Consider further evaluation with contrast enhanced pelvic MRI in 4-6 weeks.  - Had bleeding in 2021. Endometrial sampling at that time showed Benign endometrial polyp (s), fragments, Secretory endometrium, Fragments of benign squamous mucosa.  - Baseline Hgb 13.1 most recently, per patient  - P/w Hgb 6.3 -> 1U pRBC -> 6.5  - Transfuse two more pRBCs  - Maintain 2 large bore IVs  - NPO after midnight for tonight. Please resume diet in AM if no indication for any procedure  - F/u CBC q8hrs  - OBGYN on board = Outpt follow up for uterine sampling. Start Provera 20mg daily. Follow up within 7 days of discharge

## 2024-09-28 NOTE — H&P ADULT - HISTORY OF PRESENT ILLNESS
Patient is a 49F, with PMHx of UMU used to be on Venofer, who comes in with 1 week of heavy menstrual bleeding. She said she only sometimes has heavy menstrual bleeding. This time, she has been feeling shortness of breath on exertion, and generalized weakness for a week as a result. The bleeding has been decreasing over time but it has been persistsing since her initial onset of period which was on 9/13. Patient denies headache, fever, chills, nausea, vomit, chest pain, cough,  abdominal pain, diarrhea, constipation, dark/bloody stool, dysuria, hematuria,or loss of sensation.  She says that Provera that she got worked well in decreasing her bleeding today

## 2024-09-28 NOTE — H&P ADULT - NSHPADDITIONALINFOADULT_GEN_ALL_CORE
Direct patient care (interview and examination of patient), discussion with other providers, support staff and/or patient's family members, explaining in detail the diagnosis and plan to the patient and/or family members; review of medical records, ordering diagnostic tests, personally reviewing radiologic and diagnostic tests mentioned above, analyzing results, and documentation.

## 2024-09-28 NOTE — ED ADULT TRIAGE NOTE - CHIEF COMPLAINT QUOTE
vaginal bleeding x since 9/16 , hx anemia taking iron pills denies blood transfusion, ankles swollen noticed yesterday, headache since yesterday .

## 2024-09-28 NOTE — H&P ADULT - ASSESSMENT
Patient is a 49F, with PMHx of UMU used to be on Venofer, who comes in with 1 week of heavy menstrual bleeding. Admitted for acute blood loss anemia.

## 2024-09-28 NOTE — ED PROVIDER NOTE - CLINICAL SUMMARY MEDICAL DECISION MAKING FREE TEXT BOX
48yo female with pmh sonya on iron transfusions presenting with vaginal bleeding.  Menses started 9/16 and typically resolves shortly after but 9/20 bleeding got worse.  Over past few days has been with son who is admitted to hospital so has been sleeping in chairs and feeling very fatigued.  Unsure if she feels fatigued from this or bleeding.  Felt some sob when walking to the vending machine earlier.  Missed menses last month and typically regular.  Negative pregnancy test then.  No pain.  No cp, palpitations, lightheadedness, syncope.  Notes some swelling in b/l ankles.  Well appearing, nad.  Will eval anemia, pregnancy.  Edema minimal, likely from being on feet last few days, doubt dvt, chf.  Plan labs, urine, reassess.

## 2024-09-28 NOTE — CONSULT NOTE ADULT - SUBJECTIVE AND OBJECTIVE BOX
Patient is a 48 yo P      Ob Hx  Gyn Hx  Medical Hx  Surgical Hx  Allergies  Social Hx  Family Hx      VS:     Physical Examination: General appearance: Nontoxic appearing, conversant, afebrile   	 Eyes: anicteric sclerae, MELANIE, EOMI  	 HENT: Atraumatic; oropharynx clear, MMM and no ulcerations, no pharyngeal erythema or exudate  	 Neck: Trachea midline; Full range of motion, supple  	 Pulm: normal respiratory effort and no intercostal retractions, normal work of breathing  	 Extremities: No peripheral edema, no gross deformities, FROM x4  	 Skin: Dry, normal temperature, turgor and texture; no rash   Psych: Appropriate affect, cooperative      Pelvic u/s       Patient is a 49y old  Female who presents with a chief complaint of         HPI:      PAST MEDICAL & SURGICAL HISTORY:  Hypothyroid      S/P           Review of Systems:  Negative except  as above in HPI    MEDICATIONS  (STANDING):    MEDICATIONS  (PRN):      Allergies    No Known Allergies    Intolerances        SOCIAL HISTORY           FAMILY HISTORY:      Vital Signs Last 24 Hrs  T(C): 37.2 (28 Sep 2024 15:50), Max: 37.2 (28 Sep 2024 15:50)  T(F): 98.9 (28 Sep 2024 15:50), Max: 98.9 (28 Sep 2024 15:50)  HR: 106 (28 Sep 2024 15:50) (90 - 113)  BP: 94/53 (28 Sep 2024 15:50) (94/53 - 110/77)  BP(mean): --  RR: 18 (28 Sep 2024 15:50) (15 - 18)  SpO2: 100% (28 Sep 2024 15:50) (98% - 100%)    Parameters below as of 28 Sep 2024 15:50  Patient On (Oxygen Delivery Method): room air    Physical Exam:  General:  Appears stated age, well-groomed, no distress  Eyes: EOMI, conjunctiva clear  HENT:  WNL, no JVD  Chest:  clear breath sounds  Cardiovascular:  Regular rate & rhythm  Abdomen:    Extremities:  no pedal edema or calf tenderness noted  Skin:  No rash  Musculoskeletal:  normal strength  Neuro/Psych:  Alert, oriented to time, place and person     LABS:                        6.3    7.82  )-----------( 162      ( 28 Sep 2024 08:09 )             20.4     09-28    142  |  111[H]  |  7   ----------------------------<  106[H]  3.5   |  27  |  0.76    Ca    8.6      28 Sep 2024 08:09    TPro  6.4  /  Alb  3.2[L]  /  TBili  0.2  /  DBili  x   /  AST  21  /  ALT  37  /  AlkPhos  48  09-28      Urinalysis Basic - ( 28 Sep 2024 08:09 )    Color: Yellow / Appearance: Clear / S.007 / pH: x  Gluc: 106 mg/dL / Ketone: Negative mg/dL  / Bili: Negative / Urobili: 0.2 mg/dL   Blood: x / Protein: Negative mg/dL / Nitrite: Negative   Leuk Esterase: Trace / RBC: 50 /HPF / WBC 0 /HPF   Sq Epi: x / Non Sq Epi: x / Bacteria: Few /HPF          RADIOLOGY & ADDITIONAL STUDIES:    Impression/Plan:                          Patient is a 50 yo P      Ob Hx  Gyn Hx  Medical Hx  Surgical Hx  Allergies  Social Hx  Family Hx      VS:     Physical Examination: General appearance: Nontoxic appearing, conversant, afebrile   	 Eyes: anicteric sclerae, MELANIE, EOMI  	 HENT: Atraumatic; oropharynx clear, MMM and no ulcerations, no pharyngeal erythema or exudate  	 Neck: Trachea midline; Full range of motion, supple  	 Pulm: normal respiratory effort and no intercostal retractions, normal work of breathing  	 Extremities: No peripheral edema, no gross deformities, FROM x4  	 Skin: Dry, normal temperature, turgor and texture; no rash   Psych: Appropriate affect, cooperative      Pelvic u/s    < from: US Transvaginal (24 @ 15:29) >    INTERPRETATION:  CLINICAL INFORMATION: Vaginal bleeding.    LMP: 2024    COMPARISON: None available.    TECHNIQUE:  Endovaginal pelvic sonogram as per order. Transabdominal pelvic sonogram   was also performed as part of our standard protocol.    FINDINGS:  Uterus: 11.0 cm x 7.7 cm x 7.1 cm. The uterus is mildly enlarged and   heterogeneous echotexture suggestive of adenomyosis.  Endometrium: 7 mm. Within normal limits.    Right ovary: 4.7 x 3.1 x 3.0 cm. There are multiple right ovarian   follicles and cysts measuring up to 2.8 cm. It is unclear if this   represent multiple separate simple cysts or a larger 4.5 cm complex   cystic lesion.    Left ovary: 2.9 cm x 1.7 cm x 2.3 cm. There is 1.9 cm simple appearing   ovarian follicle.    Fluid: None.    IMPRESSION:  Enlarged and heterogeneous uterus suggestive of adenomyosis.    There are multiple right ovarian follicles and cysts measuring up to 2.8   cm. It is unclear if this represent multiple separate simple cysts or a   larger 4.5 cm complex cystic lesion. Consider further evaluation with   contrast enhanced pelvic MRI in 4-6 weeks.         Patient is a 49y old  female who presents with a chief complaint of feeling fatigue and short of breath over past few days. Currently her son is hospitalized here with pneumonia and she has been here due to that. Has seen Dr. Kaufman from gyn recently and had a plan to undergo endometrial biopsy on  but did not attend appt due to her son's illness. She notes cycles became irregular about a year ago and have become longer than 7 days but not heavy. She denies passage of clots, going through 4-5 pads per day. Notes over the course of today has changed 2 diapers. She denies every needing blood transfusion but previously did require iron infusions. Notes last Hgb was 13. Denies rectal bleeding or hematuria. Of note was told previously she has abnormal thyroid function but never on medications for this. TSH here normal. Received 1 unit prbc here. Feeling less fatigue and short of breath.     She notes previously being on Progestereon only OCP (Norethindrone) for a few months when the bleeding became irregular and it helped then but then her bleeding stopped responding to it.     She was told by prior gyn she has fibroids. Denies ever been told she has adenomyosis.     Ob Hx  x 2, CS x 1, EtOP x 2 with D&C, early pregnancy loss - expectant management     Gyn Hx +Hx of fibroids. +Hx of ovarian cysts many years ago. Denies STI's. Denies endometriosis.        HPI:      PAST MEDICAL & SURGICAL HISTORY:  Hypothyroid      S/P         Review of Systems:  Negative except  as above in HPI      Allergies    No Known Allergies    Intolerances        SOCIAL HISTORY Does not smoke, does not drink, does not use drugs          FAMILY HISTORY Unremarkable. No family hx of malignancy. No family hx of fibroids.       MEDS: Does not use any medications at home     Vital Signs Last 24 Hrs  T(C): 37.2 (28 Sep 2024 15:50), Max: 37.2 (28 Sep 2024 15:50)  T(F): 98.9 (28 Sep 2024 15:50), Max: 98.9 (28 Sep 2024 15:50)  HR: 106 (28 Sep 2024 15:50) (90 - 113)  BP: 94/53 (28 Sep 2024 15:50) (94/53 - 110/77)  BP(mean): --  RR: 18 (28 Sep 2024 15:50) (15 - 18)  SpO2: 100% (28 Sep 2024 15:50) (98% - 100%)    Parameters below as of 28 Sep 2024 15:50  Patient On (Oxygen Delivery Method): room air    Physical Exam:  General:  Appears stated age, well-groomed, no distress  Eyes: EOMI, conjunctiva clear  HENT:  WNL, no JVD  Chest:  clear breath sounds  Cardiovascular:  Tachycardic rate, normal rhythm, +holosystolic murmur  Abdomen:  soft, nontender, non distended  : Normal external female genitalia, 30 cc of blood noted in vaginal vault, no clots, no active bleeding, cervix notably normal, pelvic exam notable for 12 cm enlarged uterus, non tender, no irregularities or adnexal masses   Extremities:  no pedal edema or calf tenderness noted  Skin:  No rash  Neuro/Psych:  Alert, oriented to time, place and person     LABS:                        6.3    7.82  )-----------( 162      ( 28 Sep 2024 08:09 )             20.4         142  |  111[H]  |  7   ----------------------------<  106[H]  3.5   |  27  |  0.76    Ca    8.6      28 Sep 2024 08:09    TPro  6.4  /  Alb  3.2[L]  /  TBili  0.2  /  DBili  x   /  AST  21  /  ALT  37  /  AlkPhos  48        Urinalysis Basic - ( 28 Sep 2024 08:09 )    Color: Yellow / Appearance: Clear / S.007 / pH: x  Gluc: 106 mg/dL / Ketone: Negative mg/dL  / Bili: Negative / Urobili: 0.2 mg/dL   Blood: x / Protein: Negative mg/dL / Nitrite: Negative   Leuk Esterase: Trace / RBC: 50 /HPF / WBC 0 /HPF   Sq Epi: x / Non Sq Epi: x / Bacteria: Few /HPF          RADIOLOGY & ADDITIONAL STUDIES:  < from: US Transvaginal (24 @ 15:29) >  LMP: 2024    COMPARISON: None available.    TECHNIQUE:  Endovaginal pelvic sonogram as per order. Transabdominal pelvic sonogram   was also performed as part of our standard protocol.    FINDINGS:  Uterus: 11.0 cm x 7.7 cm x 7.1 cm. The uterus is mildly enlarged and   heterogeneous echotexture suggestive of adenomyosis.  Endometrium: 7 mm. Within normal limits.    Right ovary: 4.7 x 3.1 x 3.0 cm. There are multiple right ovarian   follicles and cysts measuring up to 2.8 cm. It is unclear if this   represent multiple separate simple cysts or a larger 4.5 cm complex   cystic lesion.    Left ovary: 2.9 cm x 1.7 cm x 2.3 cm. There is 1.9 cm simple appearing   ovarian follicle.    Fluid: None.    IMPRESSION:  Enlarged and heterogeneous uterus suggestive of adenomyosis.    There are multiple right ovarian follicles and cysts measuring up to 2.8   cm. It is unclear if this represent multiple separate simple cysts or a   larger 4.5 cm complex cystic lesion. Consider further evaluation with   contrast enhanced pelvic MRI in 4-6 weeks.        Impression/Plan:    50 yo  here with severe anemia requiring 1 u prbc with adenomyosis seen on imaging with abnormal uterine bleeding and prolonged menses.     Stable hemodynamically  Would recommend close interval outpatient f/u for uterine sampling and management plan  Recommend 20 mg daily Provera for menstrual bleeding cessation  Recheck H/H to ensure appropriate response to transfusion   If requires further transfusion if Hgb < 7 admit to medicine for further transfusion and repeat labs in AM  To see her gyn within 1 week of DC or can f/u with me  Will follow peripherally   Imaging reviewed with patient   Is unaware regarding her heart murmur - could be due to anemia/demand ischemia - would need to be re evaluated and f/u with cardiology outpatient      Plan rev with patient and ER team  Time spent reviewing imaging, labs notes      Elizabet Yang MD

## 2024-09-29 VITALS
OXYGEN SATURATION: 98 % | DIASTOLIC BLOOD PRESSURE: 72 MMHG | TEMPERATURE: 98 F | RESPIRATION RATE: 20 BRPM | HEART RATE: 83 BPM | SYSTOLIC BLOOD PRESSURE: 107 MMHG

## 2024-09-29 LAB
ALBUMIN SERPL ELPH-MCNC: 2.9 G/DL — LOW (ref 3.3–5)
ALP SERPL-CCNC: 41 U/L — SIGNIFICANT CHANGE UP (ref 40–120)
ALT FLD-CCNC: 34 U/L — SIGNIFICANT CHANGE UP (ref 12–78)
ANION GAP SERPL CALC-SCNC: 6 MMOL/L — SIGNIFICANT CHANGE UP (ref 5–17)
AST SERPL-CCNC: 20 U/L — SIGNIFICANT CHANGE UP (ref 15–37)
BILIRUB SERPL-MCNC: 1 MG/DL — SIGNIFICANT CHANGE UP (ref 0.2–1.2)
BUN SERPL-MCNC: 11 MG/DL — SIGNIFICANT CHANGE UP (ref 7–23)
CALCIUM SERPL-MCNC: 8.8 MG/DL — SIGNIFICANT CHANGE UP (ref 8.5–10.1)
CHLORIDE SERPL-SCNC: 111 MMOL/L — HIGH (ref 96–108)
CO2 SERPL-SCNC: 25 MMOL/L — SIGNIFICANT CHANGE UP (ref 22–31)
CREAT SERPL-MCNC: 0.83 MG/DL — SIGNIFICANT CHANGE UP (ref 0.5–1.3)
CULTURE RESULTS: SIGNIFICANT CHANGE UP
EGFR: 87 ML/MIN/1.73M2 — SIGNIFICANT CHANGE UP
GLUCOSE SERPL-MCNC: 98 MG/DL — SIGNIFICANT CHANGE UP (ref 70–99)
HCT VFR BLD CALC: 28.3 % — LOW (ref 34.5–45)
HGB BLD-MCNC: 9 G/DL — LOW (ref 11.5–15.5)
MCHC RBC-ENTMCNC: 27 PG — SIGNIFICANT CHANGE UP (ref 27–34)
MCHC RBC-ENTMCNC: 31.8 G/DL — LOW (ref 32–36)
MCV RBC AUTO: 85 FL — SIGNIFICANT CHANGE UP (ref 80–100)
NRBC # BLD: 2 /100 WBCS — HIGH (ref 0–0)
PLATELET # BLD AUTO: 129 K/UL — LOW (ref 150–400)
POTASSIUM SERPL-MCNC: 3.7 MMOL/L — SIGNIFICANT CHANGE UP (ref 3.5–5.3)
POTASSIUM SERPL-SCNC: 3.7 MMOL/L — SIGNIFICANT CHANGE UP (ref 3.5–5.3)
PROT SERPL-MCNC: 6 GM/DL — SIGNIFICANT CHANGE UP (ref 6–8.3)
RBC # BLD: 3.33 M/UL — LOW (ref 3.8–5.2)
RBC # FLD: 16.2 % — HIGH (ref 10.3–14.5)
SODIUM SERPL-SCNC: 142 MMOL/L — SIGNIFICANT CHANGE UP (ref 135–145)
SPECIMEN SOURCE: SIGNIFICANT CHANGE UP
WBC # BLD: 7.36 K/UL — SIGNIFICANT CHANGE UP (ref 3.8–10.5)
WBC # FLD AUTO: 7.36 K/UL — SIGNIFICANT CHANGE UP (ref 3.8–10.5)

## 2024-09-29 PROCEDURE — 99238 HOSP IP/OBS DSCHRG MGMT 30/<: CPT

## 2024-09-29 RX ORDER — MEDROXYPROGESTERONE ACETATE 150 MG/ML
2 INJECTION, SUSPENSION INTRAMUSCULAR
Qty: 60 | Refills: 0
Start: 2024-09-29 | End: 2024-10-28

## 2024-09-29 RX ORDER — MEDROXYPROGESTERONE ACETATE 150 MG/ML
20 INJECTION, SUSPENSION INTRAMUSCULAR DAILY
Refills: 0 | Status: DISCONTINUED | OUTPATIENT
Start: 2024-09-29 | End: 2024-09-29

## 2024-09-29 RX ORDER — MEDROXYPROGESTERONE ACETATE 150 MG/ML
2 INJECTION, SUSPENSION INTRAMUSCULAR
Qty: 0 | Refills: 0 | DISCHARGE
Start: 2024-09-29

## 2024-09-29 RX ORDER — FERROUS SULFATE 325(65) MG
325 TABLET ORAL DAILY
Refills: 0 | Status: DISCONTINUED | OUTPATIENT
Start: 2024-09-29 | End: 2024-09-29

## 2024-09-29 RX ORDER — FERROUS SULFATE 325(65) MG
1 TABLET ORAL
Qty: 0 | Refills: 0 | DISCHARGE
Start: 2024-09-29

## 2024-09-29 RX ADMIN — MEDROXYPROGESTERONE ACETATE 20 MILLIGRAM(S): 150 INJECTION, SUSPENSION INTRAMUSCULAR at 15:33

## 2024-09-29 RX ADMIN — Medication 325 MILLIGRAM(S): at 15:33

## 2024-09-29 NOTE — DISCHARGE NOTE NURSING/CASE MANAGEMENT/SOCIAL WORK - NSDCVIVACCINE_GEN_ALL_CORE_FT
Tdap; 11-Aug-2017 23:01; Lilly Man (RN); [employer]; [id number]; IntraMuscular; Deltoid Right.; 0.5 milliLiter(s); VIS (VIS Published: 09-May-2013, VIS Presented: 11-Aug-2017);

## 2024-09-29 NOTE — PROGRESS NOTE ADULT - ASSESSMENT
Patient is a 49F, with PMHx of UMU used to be on Venofer, who comes in with 1 week of heavy menstrual bleeding. Admitted for acute blood loss anemia. Patient is a 49F, with PMHx of UMU used to be on Venofer, who comes in with 1 week of heavy menstrual bleeding.     Admitted for acute blood loss anemia from likely uterine fibroids.     HGB improved today to 9.0 from 6.3.     Pelvic sono notes fibroids and right uterine cyst, needs pelvic MRI in 6 weeks.     Add daily Iron supplements.     Possible discharge home later today.  Patient is a 49F, with PMHx of UMU used to be on Venofer, who comes in with 1 week of heavy menstrual bleeding.     Admitted for acute blood loss anemia from likely uterine fibroids.     HGB improved today to 9.0 from 6.3.     Pelvic sono notes fibroids and right uterine cyst, needs pelvic MRI in 6 weeks as outpatient.      Add daily Iron supplements.     Possible discharge home later today.     See outside GYN doctor later this week, MRI in 6 weeks as above.

## 2024-09-29 NOTE — DISCHARGE NOTE PROVIDER - HOSPITAL COURSE
Patient is a 49 female PMH of [facility] used to be on Venofer, who comes in with 1 week of heavy menstrual bleeding. She said she only sometimes has heavy menstrual bleeding. This time, she has been feeling shortness of breath on exertion, and generalized weakness for a week as a result. The bleeding has been decreasing over time but it has been persistsing since her initial onset of period which was on 9/13. Patient denies headache, fever, chills, nausea, vomit, chest pain, cough,  abdominal pain, diarrhea, constipation, dark/bloody stool, dysuria, hematuria,or loss of sensation.     She says that Provera that she got worked well in decreasing her bleeding today.     HGB improved to 9.0 from  6.3. No further GYN bleeding. Eager for discharge.     Will add daily iron supplements.     See GYN doctor later in week for further treatment of fibroids.

## 2024-09-29 NOTE — ED ADULT NURSE REASSESSMENT NOTE - NS ED NURSE REASSESS COMMENT FT1
Pt resting comfortably at this time. No s/s of pain noted. Pt provided with food and hydration. Continuous SPO2 and cardiac monitoring in place, pt pending bed placement for admission. VSS as documented. Plan of care ongoing.

## 2024-09-29 NOTE — DISCHARGE NOTE PROVIDER - NSDCMRMEDTOKEN_GEN_ALL_CORE_FT
medroxyPROGESTERone 10 mg oral tablet: 2 tab(s) orally once a day   ferrous sulfate 325 mg (65 mg elemental iron) oral tablet: 1 tab(s) orally once a day  medroxyPROGESTERone 10 mg oral tablet: 2 tab(s) orally once a day

## 2024-09-29 NOTE — PROGRESS NOTE ADULT - SUBJECTIVE AND OBJECTIVE BOX
INTERVAL HPI/OVERNIGHT EVENTS:  Pt seen and examined at bedside.     Allergies/Intolerance: No Known Allergies      MEDICATIONS  (STANDING):    MEDICATIONS  (PRN):  acetaminophen     Tablet .. 650 milliGRAM(s) Oral every 6 hours PRN Temp greater or equal to 38C (100.4F), Mild Pain (1 - 3)  aluminum hydroxide/magnesium hydroxide/simethicone Suspension 30 milliLiter(s) Oral every 4 hours PRN Dyspepsia  melatonin 3 milliGRAM(s) Oral at bedtime PRN Insomnia  ondansetron Injectable 4 milliGRAM(s) IV Push every 8 hours PRN Nausea and/or Vomiting        ROS: all systems reviewed and wnl      PHYSICAL EXAMINATION:  Vital Signs Last 24 Hrs  T(C): 36.7 (29 Sep 2024 07:48), Max: 37.7 (28 Sep 2024 21:35)  T(F): 98.1 (29 Sep 2024 07:48), Max: 99.8 (28 Sep 2024 21:35)  HR: 97 (29 Sep 2024 07:48) (87 - 109)  BP: 120/73 (29 Sep 2024 07:48) (94/53 - 120/73)  BP(mean): --  RR: 23 (29 Sep 2024 07:48) (15 - 23)  SpO2: 100% (29 Sep 2024 07:48) (98% - 100%)    Parameters below as of 29 Sep 2024 07:48  Patient On (Oxygen Delivery Method): room air      CAPILLARY BLOOD GLUCOSE            GENERAL:   NECK: supple, No JVD  CHEST/LUNG: clear to auscultation bilaterally; no rales, rhonchi, or wheezing b/l  HEART: normal S1, S2  ABDOMEN: BS+, soft, ND, NT   EXTREMITIES:  pulses palpable; no clubbing, cyanosis, or edema b/l LEs    LABS:                        9.0    7.36  )-----------( 129      ( 29 Sep 2024 05:15 )             28.3     09-29    142  |  111[H]  |  11  ----------------------------<  98  3.7   |  25  |  0.83    Ca    8.8      29 Sep 2024 05:15    TPro  6.0  /  Alb  2.9[L]  /  TBili  1.0  /  DBili  x   /  AST  20  /  ALT  34  /  AlkPhos  41  09-29      Urinalysis Basic - ( 29 Sep 2024 05:15 )    Color: x / Appearance: x / SG: x / pH: x  Gluc: 98 mg/dL / Ketone: x  / Bili: x / Urobili: x   Blood: x / Protein: x / Nitrite: x   Leuk Esterase: x / RBC: x / WBC x   Sq Epi: x / Non Sq Epi: x / Bacteria: x

## 2024-09-29 NOTE — DISCHARGE NOTE NURSING/CASE MANAGEMENT/SOCIAL WORK - NSDCPEFALRISK_GEN_ALL_CORE
For information on Fall & Injury Prevention, visit: [email or web address] OR  [email or web address] OR  https://www.cdc.gov/steadi/patient.html
Patent

## 2024-09-29 NOTE — DISCHARGE NOTE NURSING/CASE MANAGEMENT/SOCIAL WORK - PATIENT PORTAL LINK FT
You can access the FollowMyHealth Patient Portal offered by [facility] by registering at the following website: [email or web address]. By joining [employer]’s FollowMyHealth portal, you will also be able to view your health information using other applications (apps) compatible with our system.

## 2024-10-07 DIAGNOSIS — D62 ACUTE POSTHEMORRHAGIC ANEMIA: ICD-10-CM

## 2024-10-07 DIAGNOSIS — R01.1 CARDIAC MURMUR, UNSPECIFIED: ICD-10-CM

## 2024-10-07 DIAGNOSIS — N83.201 UNSPECIFIED OVARIAN CYST, RIGHT SIDE: ICD-10-CM

## 2024-10-07 DIAGNOSIS — D25.9 LEIOMYOMA OF UTERUS, UNSPECIFIED: ICD-10-CM

## 2025-01-30 NOTE — DISCHARGE NOTE OB - CLICK TO LAUNCH ORM
Prep Survey      Flowsheet Row Responses   [facility] patient discharged from? [place]   Is LACE score < 7 ? No   Eligibility [facility]   Date of Admission 01/21/25   Date of Discharge 01/30/25   [place] [facility] [place]charge diagnosis RSV bronchitis, Acute exacerbation of moderate persistent asthma secondary to RSV infection   Does the patient have one of the following disease processes/diagnoses(primary or secondary)? Other   Does the patient have Home health ordered? Yes   What is the Home health agency?  [facility]    Is there a DME ordered? Yes   What DME was ordered? nebulizer from [place]   Prep survey completed? Yes            Denia MORENO - Registered Nurse           .

## 2025-03-04 NOTE — ED ADULT NURSE NOTE - HIV OFFER
Instructions: This plan will send the code FBSE to the PM system.  DO NOT or CHANGE the price. Detail Level: Simple Price (Do Not Change): 0.00 Opt out